# Patient Record
Sex: MALE | Race: WHITE | NOT HISPANIC OR LATINO | Employment: FULL TIME | URBAN - METROPOLITAN AREA
[De-identification: names, ages, dates, MRNs, and addresses within clinical notes are randomized per-mention and may not be internally consistent; named-entity substitution may affect disease eponyms.]

---

## 2017-01-12 ENCOUNTER — TRANSCRIBE ORDERS (OUTPATIENT)
Dept: ADMINISTRATIVE | Facility: HOSPITAL | Age: 42
End: 2017-01-12

## 2017-01-12 DIAGNOSIS — R05.9 COUGH: Primary | ICD-10-CM

## 2017-01-19 ENCOUNTER — HOSPITAL ENCOUNTER (OUTPATIENT)
Dept: RADIOLOGY | Facility: HOSPITAL | Age: 42
Discharge: HOME | End: 2017-01-19
Payer: COMMERCIAL

## 2017-01-19 DIAGNOSIS — R05.9 COUGH: ICD-10-CM

## 2017-01-19 PROCEDURE — 74240 X-RAY XM UPR GI TRC 1CNTRST: CPT

## 2018-11-20 ENCOUNTER — OFFICE VISIT (OUTPATIENT)
Dept: INTERNAL MEDICINE CLINIC | Facility: CLINIC | Age: 43
End: 2018-11-20
Payer: COMMERCIAL

## 2018-11-20 VITALS
RESPIRATION RATE: 18 BRPM | BODY MASS INDEX: 26.52 KG/M2 | HEIGHT: 72 IN | DIASTOLIC BLOOD PRESSURE: 80 MMHG | HEART RATE: 78 BPM | OXYGEN SATURATION: 97 % | WEIGHT: 195.8 LBS | TEMPERATURE: 97.6 F | SYSTOLIC BLOOD PRESSURE: 118 MMHG

## 2018-11-20 DIAGNOSIS — Z76.89 ENCOUNTER TO ESTABLISH CARE WITH NEW DOCTOR: Primary | ICD-10-CM

## 2018-11-20 DIAGNOSIS — G89.29 CHRONIC PAIN OF RIGHT KNEE: ICD-10-CM

## 2018-11-20 DIAGNOSIS — Z13.220 ENCOUNTER FOR LIPID SCREENING FOR CARDIOVASCULAR DISEASE: ICD-10-CM

## 2018-11-20 DIAGNOSIS — M25.561 CHRONIC PAIN OF RIGHT KNEE: ICD-10-CM

## 2018-11-20 DIAGNOSIS — Z13.6 ENCOUNTER FOR LIPID SCREENING FOR CARDIOVASCULAR DISEASE: ICD-10-CM

## 2018-11-20 DIAGNOSIS — K21.9 LARYNGOPHARYNGEAL REFLUX: ICD-10-CM

## 2018-11-20 PROCEDURE — 99204 OFFICE O/P NEW MOD 45 MIN: CPT | Performed by: INTERNAL MEDICINE

## 2018-11-20 PROCEDURE — 3008F BODY MASS INDEX DOCD: CPT | Performed by: INTERNAL MEDICINE

## 2018-11-20 PROCEDURE — 1036F TOBACCO NON-USER: CPT | Performed by: INTERNAL MEDICINE

## 2018-11-20 RX ORDER — OMEPRAZOLE 20 MG/1
CAPSULE, DELAYED RELEASE ORAL
COMMUNITY
Start: 2018-11-13 | End: 2019-09-18 | Stop reason: SDUPTHER

## 2018-11-20 NOTE — PATIENT INSTRUCTIONS
1  Knee x-ray  2  Physical therapy  3  Fasting blood work  4  Elastic knee brace  5  Low-impact exercises -> discuss with physical therapist  6  Return in 6 months or sooner if questions  7   Ranitidine 150mg twice a day for 2 days of week with 4 days of week omeprazole then increase ranitidine by 1 day per week over next 4 weeks

## 2018-11-20 NOTE — PROGRESS NOTES
Assessment/Plan:     Diagnoses and all orders for this visit:    Encounter to establish care with new doctor    Laryngopharyngeal reflux  Comments:  transition from PPI to H2B over 4-5 weeks, instructions provided  Orders:  -     Comprehensive metabolic panel; Future  -     CBC and differential    Encounter for lipid screening for cardiovascular disease  Comments:  check FLP, not done in sometime  Orders:  -     Lipid Panel with Direct LDL reflex; Future    Chronic pain of right knee  Comments:  suspect sprain, however if pain does not improve with PT(s/p knee x-ray), t/c MRI to r/o cartilage tear  Orders:  -     Ambulatory referral to Physical Therapy; Future  -     XR knee 3 vw right non injury; Future    Other orders  -     omeprazole (PriLOSEC) 20 mg delayed release capsule;           Subjective:      Patient ID: Eduardo Hoffman is a 38 y o  male  HPI    New to practice, here to establish care  Takes no medication on regular basis  Hx of LPR, s/p ENT Eval and prescribed PPI BID but tapering down dose over recent months and no return of symtpoms  Not exercising, works for [employer] in [place] on flu vaccine, c/o weeks of right medial knee pain  No swelling, previous knee problems or fall/injury prior to sx starting  Pain presents with use of knee  Would like to complete PT for knee pain  Denies any other complaints  History reviewed  No pertinent past medical history  Vitals:    11/20/18 0903   BP: 118/80   Pulse: 78   Resp: 18   Temp: 97 6 °F (36 4 °C)   TempSrc: Oral   SpO2: 97%   Weight: 88 8 kg (195 lb 12 8 oz)   Height: 5' 11 5" (1 816 m)     Body mass index is 26 93 kg/m²  Current Outpatient Prescriptions:     omeprazole (PriLOSEC) 20 mg delayed release capsule, , Disp: , Rfl:   No Known Allergies      Review of Systems   Constitutional: Negative for fever  HENT: Negative for congestion  Eyes: Negative for visual disturbance     Respiratory: Negative for shortness of breath  Cardiovascular: Negative for chest pain  Gastrointestinal: Negative for abdominal pain  Genitourinary: Negative for dysuria  Musculoskeletal: Positive for arthralgias  Negative for joint swelling  Skin: Negative for rash  Neurological: Negative for headaches  Psychiatric/Behavioral: Negative for dysphoric mood  Objective:      /80   Pulse 78   Temp 97 6 °F (36 4 °C) (Oral)   Resp 18   Ht 5' 11 5" (1 816 m)   Wt 88 8 kg (195 lb 12 8 oz)   SpO2 97%   BMI 26 93 kg/m²          Physical Exam   Constitutional: He appears well-developed and well-nourished  HENT:   Head: Normocephalic and atraumatic  Mouth/Throat: Oropharynx is clear and moist    Eyes: Pupils are equal, round, and reactive to light  Conjunctivae are normal    Cardiovascular: Normal rate, regular rhythm and normal heart sounds  No murmur heard  Pulmonary/Chest: Effort normal and breath sounds normal  He has no wheezes  He has no rales  Abdominal: Soft  Bowel sounds are normal  There is no tenderness  Musculoskeletal: He exhibits no edema  Right knee no edema, erythema or increased warmth  No crepitus or pain with passive ROM  lachman's test neg b/l   Neurological: He is alert  Psychiatric: He has a normal mood and affect  His behavior is normal    Vitals reviewed

## 2019-06-24 ENCOUNTER — APPOINTMENT (OUTPATIENT)
Dept: LAB | Facility: CLINIC | Age: 44
End: 2019-06-24
Payer: COMMERCIAL

## 2019-06-24 DIAGNOSIS — K21.9 LARYNGOPHARYNGEAL REFLUX: ICD-10-CM

## 2019-06-24 DIAGNOSIS — Z13.220 ENCOUNTER FOR LIPID SCREENING FOR CARDIOVASCULAR DISEASE: ICD-10-CM

## 2019-06-24 DIAGNOSIS — Z13.6 ENCOUNTER FOR LIPID SCREENING FOR CARDIOVASCULAR DISEASE: ICD-10-CM

## 2019-06-24 LAB
ALBUMIN SERPL BCP-MCNC: 4 G/DL (ref 3.5–5)
ALP SERPL-CCNC: 106 U/L (ref 46–116)
ALT SERPL W P-5'-P-CCNC: 43 U/L (ref 12–78)
ANION GAP SERPL CALCULATED.3IONS-SCNC: 5 MMOL/L (ref 4–13)
AST SERPL W P-5'-P-CCNC: 21 U/L (ref 5–45)
BASOPHILS # BLD AUTO: 0.02 THOUSANDS/ΜL (ref 0–0.1)
BASOPHILS NFR BLD AUTO: 0 % (ref 0–1)
BILIRUB SERPL-MCNC: 0.58 MG/DL (ref 0.2–1)
BUN SERPL-MCNC: 16 MG/DL (ref 5–25)
CALCIUM SERPL-MCNC: 9.3 MG/DL (ref 8.3–10.1)
CHLORIDE SERPL-SCNC: 103 MMOL/L (ref 100–108)
CHOLEST SERPL-MCNC: 225 MG/DL (ref 50–200)
CO2 SERPL-SCNC: 28 MMOL/L (ref 21–32)
CREAT SERPL-MCNC: 0.94 MG/DL (ref 0.6–1.3)
EOSINOPHIL # BLD AUTO: 0.16 THOUSAND/ΜL (ref 0–0.61)
EOSINOPHIL NFR BLD AUTO: 3 % (ref 0–6)
ERYTHROCYTE [DISTWIDTH] IN BLOOD BY AUTOMATED COUNT: 11.9 % (ref 11.6–15.1)
GFR SERPL CREATININE-BSD FRML MDRD: 98 ML/MIN/1.73SQ M
GLUCOSE P FAST SERPL-MCNC: 89 MG/DL (ref 65–99)
HCT VFR BLD AUTO: 47.8 % (ref 36.5–49.3)
HDLC SERPL-MCNC: 55 MG/DL (ref 40–60)
HGB BLD-MCNC: 16 G/DL (ref 12–17)
IMM GRANULOCYTES # BLD AUTO: 0.02 THOUSAND/UL (ref 0–0.2)
IMM GRANULOCYTES NFR BLD AUTO: 0 % (ref 0–2)
LDLC SERPL CALC-MCNC: 151 MG/DL (ref 0–100)
LYMPHOCYTES # BLD AUTO: 1.92 THOUSANDS/ΜL (ref 0.6–4.47)
LYMPHOCYTES NFR BLD AUTO: 32 % (ref 14–44)
MCH RBC QN AUTO: 29.7 PG (ref 26.8–34.3)
MCHC RBC AUTO-ENTMCNC: 33.5 G/DL (ref 31.4–37.4)
MCV RBC AUTO: 89 FL (ref 82–98)
MONOCYTES # BLD AUTO: 0.56 THOUSAND/ΜL (ref 0.17–1.22)
MONOCYTES NFR BLD AUTO: 9 % (ref 4–12)
NEUTROPHILS # BLD AUTO: 3.4 THOUSANDS/ΜL (ref 1.85–7.62)
NEUTS SEG NFR BLD AUTO: 56 % (ref 43–75)
NRBC BLD AUTO-RTO: 0 /100 WBCS
PLATELET # BLD AUTO: 188 THOUSANDS/UL (ref 149–390)
PMV BLD AUTO: 10.9 FL (ref 8.9–12.7)
POTASSIUM SERPL-SCNC: 4.8 MMOL/L (ref 3.5–5.3)
PROT SERPL-MCNC: 7.9 G/DL (ref 6.4–8.2)
RBC # BLD AUTO: 5.38 MILLION/UL (ref 3.88–5.62)
SODIUM SERPL-SCNC: 136 MMOL/L (ref 136–145)
TRIGL SERPL-MCNC: 93 MG/DL
WBC # BLD AUTO: 6.08 THOUSAND/UL (ref 4.31–10.16)

## 2019-06-24 PROCEDURE — 80061 LIPID PANEL: CPT

## 2019-06-24 PROCEDURE — 36415 COLL VENOUS BLD VENIPUNCTURE: CPT | Performed by: INTERNAL MEDICINE

## 2019-06-24 PROCEDURE — 80053 COMPREHEN METABOLIC PANEL: CPT

## 2019-06-24 PROCEDURE — 85025 COMPLETE CBC W/AUTO DIFF WBC: CPT | Performed by: INTERNAL MEDICINE

## 2019-06-25 ENCOUNTER — OFFICE VISIT (OUTPATIENT)
Dept: INTERNAL MEDICINE CLINIC | Facility: CLINIC | Age: 44
End: 2019-06-25
Payer: COMMERCIAL

## 2019-06-25 VITALS
HEART RATE: 84 BPM | SYSTOLIC BLOOD PRESSURE: 124 MMHG | WEIGHT: 202 LBS | HEIGHT: 72 IN | BODY MASS INDEX: 27.36 KG/M2 | DIASTOLIC BLOOD PRESSURE: 84 MMHG | TEMPERATURE: 98.1 F | OXYGEN SATURATION: 98 % | RESPIRATION RATE: 16 BRPM

## 2019-06-25 DIAGNOSIS — K21.9 LARYNGOPHARYNGEAL REFLUX: ICD-10-CM

## 2019-06-25 DIAGNOSIS — L81.9 CHANGE IN PIGMENTED SKIN LESION: Primary | ICD-10-CM

## 2019-06-25 DIAGNOSIS — E78.5 DYSLIPIDEMIA: ICD-10-CM

## 2019-06-25 PROCEDURE — 1036F TOBACCO NON-USER: CPT | Performed by: INTERNAL MEDICINE

## 2019-06-25 PROCEDURE — 3008F BODY MASS INDEX DOCD: CPT | Performed by: INTERNAL MEDICINE

## 2019-06-25 PROCEDURE — 99214 OFFICE O/P EST MOD 30 MIN: CPT | Performed by: INTERNAL MEDICINE

## 2019-11-20 ENCOUNTER — OFFICE VISIT (OUTPATIENT)
Dept: INTERNAL MEDICINE CLINIC | Facility: CLINIC | Age: 44
End: 2019-11-20
Payer: COMMERCIAL

## 2019-11-20 VITALS
SYSTOLIC BLOOD PRESSURE: 128 MMHG | HEART RATE: 105 BPM | BODY MASS INDEX: 26.68 KG/M2 | OXYGEN SATURATION: 97 % | HEIGHT: 72 IN | DIASTOLIC BLOOD PRESSURE: 80 MMHG | WEIGHT: 197 LBS

## 2019-11-20 DIAGNOSIS — Z84.81 FHX: BRCA2 GENE POSITIVE: ICD-10-CM

## 2019-11-20 DIAGNOSIS — G47.9 TROUBLE IN SLEEPING: ICD-10-CM

## 2019-11-20 DIAGNOSIS — F43.0 ANXIETY AS ACUTE REACTION TO GROSS STRESS: Primary | ICD-10-CM

## 2019-11-20 DIAGNOSIS — F41.1 ANXIETY AS ACUTE REACTION TO GROSS STRESS: Primary | ICD-10-CM

## 2019-11-20 PROCEDURE — 1036F TOBACCO NON-USER: CPT | Performed by: INTERNAL MEDICINE

## 2019-11-20 PROCEDURE — 99214 OFFICE O/P EST MOD 30 MIN: CPT | Performed by: INTERNAL MEDICINE

## 2019-11-20 RX ORDER — HYDROXYZINE PAMOATE 25 MG/1
25 CAPSULE ORAL 2 TIMES DAILY PRN
Qty: 30 CAPSULE | Refills: 0 | Status: SHIPPED | OUTPATIENT
Start: 2019-11-20 | End: 2020-02-18 | Stop reason: SDUPTHER

## 2019-11-20 RX ORDER — TRAZODONE HYDROCHLORIDE 50 MG/1
25 TABLET ORAL
Qty: 15 TABLET | Refills: 0 | Status: SHIPPED | OUTPATIENT
Start: 2019-11-20 | End: 2019-12-12 | Stop reason: SDUPTHER

## 2019-11-20 NOTE — PATIENT INSTRUCTIONS
1  Vistaril as needed, may cause drowsiness  2  Appointment with therapist  3  Return in 1 month    Mastery of Your Anxiety and Worry by Aramis Ibrahim  The relaxation and stress reduction workbook by Dakota Cortez  "E-couch" an online free therapy program    Anxiety   AMBULATORY CARE:   Anxiety  is a condition that causes you to feel extremely worried or nervous  The feelings are so strong that they can cause problems with your daily activities or sleep  Anxiety may be triggered by something you fear, or it may happen without a cause  Family or work stress, smoking, caffeine, and alcohol can increase your risk for anxiety  Certain medicines or health conditions can also increase your risk  Anxiety can become a long-term condition if it is not managed or treated  Common signs and symptoms that may occur with anxiety:   · Fatigue or muscle tightness     · Shaking, restlessness, or irritability     · Problems focusing     · Trouble sleeping     · Feeling jumpy, easily startled, or dizzy     · Rapid heartbeat or shortness of breath  Call 911 if:   · You have chest pain, tightness, or heaviness that may spread to your shoulders, arms, jaw, neck, or back  · You feel like hurting yourself or someone else  Contact your healthcare provider if:   · Your symptoms get worse or do not get better with treatment  · You think your medicine may be causing side effects  · Your anxiety keeps you from doing your regular daily activities  · You have new symptoms since your last visit  · You have questions or concerns about your condition or care  Treatment for anxiety  may include medicines to help you feel calm and relaxed, and decrease your symptoms  Medicines are usually given together with therapy or other treatments  Manage anxiety:   · Talk to someone about your anxiety  Your healthcare provider may suggest counseling   Cognitive behavioral therapy can help you understand and change how you react to events that trigger your symptoms  You might feel more comfortable talking with a friend or family member about your anxiety  Choose someone you know will be supportive and encouraging  · Find ways to relax  Activities such as exercise, meditation, or listening to music can help you relax  Spend time with friends, or do things you enjoy  · Practice deep breathing  Deep breathing can help you relax when you feel anxious  Focus on taking slow, deep breaths several times a day, or during an anxiety attack  Breathe in through your nose and out through your mouth  · Create a regular sleep routine  Regular sleep can help you feel calmer during the day  Go to sleep and wake up at the same times every day  Do not watch television or use the computer right before bed  Your room should be comfortable, dark, and quiet  · Eat a variety of healthy foods  Healthy foods include fruits, vegetables, low-fat dairy products, lean meats, fish, whole-grain breads, and cooked beans  Healthy foods can help you feel less anxious and have more energy  · Exercise regularly  Exercise can increase your energy level  Exercise may also lift your mood and help you sleep better  Your healthcare provider can help you create an exercise plan  · Do not smoke  Nicotine and other chemicals in cigarettes and cigars can increase anxiety  Ask your healthcare provider for information if you currently smoke and need help to quit  E-cigarettes or smokeless tobacco still contain nicotine  Talk to your healthcare provider before you use these products  · Do not have caffeine  Caffeine can make your symptoms worse  Do not have foods or drinks that are meant to increase your energy level  · Limit or do not drink alcohol  Ask your healthcare provider if alcohol is safe for you  You may not be able to drink alcohol if you take certain anxiety or depression medicines  Limit alcohol to 1 drink per day if you are a woman   Limit alcohol to 2 drinks per day if you are a man  A drink of alcohol is 12 ounces of beer, 5 ounces of wine, or 1½ ounces of liquor  · Do not use drugs  Drugs can make your anxiety worse  It can also make anxiety hard to manage  Talk to your healthcare provider if you use drugs and want help to quit  Follow up with your healthcare provider as directed:  Write down your questions so you remember to ask them during your visits  © 2017 [employer] [employer] Information is for End User's use only and may not be sold, redistributed or otherwise used for commercial purposes  All illustrations and images included in CareNotes® are the copyrighted property of A D A M , Inc  or Hardy Ahuja  The above information is an  only  It is not intended as medical advice for individual conditions or treatments  Talk to your doctor, nurse or pharmacist before following any medical regimen to see if it is safe and effective for you

## 2019-11-20 NOTE — PROGRESS NOTES
Assessment/Plan:     Diagnoses and all orders for this visit:    Anxiety as acute reaction to gross stress  Comments:  counseling provided including exercises, self-help books and advised to go back and see therapist   trazodone and vistaril ordered per pt request, f/u in 1 mo  Orders:  -     hydrOXYzine pamoate (VISTARIL) 25 mg capsule; Take 1 capsule (25 mg total) by mouth 2 (two) times a day as needed for anxiety (may cause drowsiness)    Trouble in sleeping  Comments:  trial of trazodone, d/w pt side effects of meds prescribed today  Orders:  -     traZODone (DESYREL) 50 mg tablet; Take 0 5 tablets (25 mg total) by mouth daily at bedtime as needed for sleep    FHx: BRCA2 gene positive  Comments:  recommend genetic counseling, pt prefers to put this off for now    Other orders  -     Cancel: influenza vaccine, [id number], quadrivalent, 0 5 mL, preservative-free, for adult and pediatric patients 6 mos+ (AFLURIA, FLUARIX, [place] 9101, FLUZONE)      patient prefers to put off discussing genetic counseling at this time, will discuss referring him again at his next appointment  Spent >25 mins with patient including >50% of time counseling/coordination of care      Subjective:      Patient ID: Terrell Tucker is a 37 y o  male  HPI    Here with c/o acute stress/anxiety  Here with friend from work (Dayana) who pt would like present during appt to discuss his medical care  Has some mild anxiety symptoms in past but never treated and denies depression  is a single father, raising a teenage daughter  work is stressful in IT at [employer]  mother recently dx'd with breast cancer and has BRCA2 gene as well  MARCELLE-7 score of 5-6  Had a panic attack few years ago after watching a [employer] game in [place], went to his parents and felt better  Having some trouble sleeping recently as well  Was seeing a counselor few years ago when went thru divorce and is planning on calling him today to make an appt        Has had occ head shaking when he becomes stressed & has done this since childhood  no one has noticed this except his parents  also c/o teeth chattering also when he feels stressed  Worried this could be symptoms of a neurologic disorder  No other complaints  History reviewed  No pertinent past medical history  Vitals:    11/20/19 0936 11/20/19 1014 11/20/19 1020   BP: 142/94 138/90 128/80   BP Location: Right arm  Left arm   Patient Position: Sitting Sitting Sitting   Cuff Size: Standard  Standard   Pulse: 105     SpO2: 97%     Weight: 89 4 kg (197 lb)     Height: 5' 11 5" (1 816 m)       Body mass index is 27 09 kg/m²  Current Outpatient Medications:     hydrOXYzine pamoate (VISTARIL) 25 mg capsule, Take 1 capsule (25 mg total) by mouth 2 (two) times a day as needed for anxiety (may cause drowsiness), Disp: 30 capsule, Rfl: 0    omeprazole (PriLOSEC) 20 mg delayed release capsule, TAKE 1 (ONE) CAPSULE DR DAILY, Disp: 90 capsule, Rfl: 4    traZODone (DESYREL) 50 mg tablet, Take 0 5 tablets (25 mg total) by mouth daily at bedtime as needed for sleep, Disp: 15 tablet, Rfl: 0  No Known Allergies      Review of Systems   Constitutional: Negative for fever  HENT: Negative for congestion  Eyes: Negative for visual disturbance  Respiratory: Negative for shortness of breath  Cardiovascular: Negative for chest pain  Gastrointestinal: Negative for abdominal pain  Endocrine: Negative for polyuria  Genitourinary: Negative for dysuria  Musculoskeletal: Negative for arthralgias  Skin: Negative for rash  Allergic/Immunologic: Negative for immunocompromised state  Neurological: Negative for headaches  Psychiatric/Behavioral: Positive for sleep disturbance  Negative for dysphoric mood  The patient is nervous/anxious            Objective:      /80 (BP Location: Left arm, Patient Position: Sitting, Cuff Size: Standard)   Pulse 105   Ht 5' 11 5" (1 816 m)   Wt 89 4 kg (197 lb)   SpO2 97%   BMI 27 09 kg/m²          Physical Exam   Constitutional: He is oriented to person, place, and time  He appears well-developed and well-nourished  HENT:   Head: Normocephalic and atraumatic  Eyes: Pupils are equal, round, and reactive to light  Cardiovascular: Normal rate and regular rhythm  Pulmonary/Chest: Effort normal and breath sounds normal  He has no wheezes  He has no rales  Abdominal: Soft  Bowel sounds are normal  There is no tenderness  Musculoskeletal: He exhibits no edema  Neurological: He is alert and oriented to person, place, and time  He has normal strength and normal reflexes  He displays no tremor (no head or hand/arm tremor b/l)  He displays a negative Romberg sign  Coordination and gait normal    Psychiatric: His behavior is normal  His mood appears anxious  He does not exhibit a depressed mood  Vitals reviewed  MARCELLE-7 Flowsheet Screening      Most Recent Value   Over the last two weeks, how often have you been bothered by the following problems?     Feeling nervous, anxious, or on edge  1   Not being able to stop or control worrying  2   Worrying too much about different things  1   Trouble relaxing   1   Being so restless that it's hard to sit still  0   Becoming easily annoyed or irritable   0   Feeling afraid as if something awful might happen  1   MARCELLE Score   6        Results for orders placed or performed in visit on 06/24/19   Lipid Panel with Direct LDL reflex   Result Value Ref Range    Cholesterol 225 (H) 50 - 200 mg/dL    Triglycerides 93 <=150 mg/dL    HDL, Direct 55 40 - 60 mg/dL    LDL Calculated 151 (H) 0 - 100 mg/dL   Comprehensive metabolic panel   Result Value Ref Range    Sodium 136 136 - 145 mmol/L    Potassium 4 8 3 5 - 5 3 mmol/L    Chloride 103 100 - 108 mmol/L    CO2 28 21 - 32 mmol/L    ANION GAP 5 4 - 13 mmol/L    BUN 16 5 - 25 mg/dL    Creatinine 0 94 0 60 - 1 30 mg/dL    Glucose, Fasting 89 65 - 99 mg/dL    Calcium 9 3 8 3 - 10 1 mg/dL    AST 21 5 - 45 U/L    ALT 43 12 - 78 U/L    Alkaline Phosphatase 106 46 - 116 U/L    Total Protein 7 9 6 4 - 8 2 g/dL    Albumin 4 0 3 5 - 5 0 g/dL    Total Bilirubin 0 58 0 20 - 1 00 mg/dL    eGFR 98 ml/min/1 73sq m

## 2019-12-12 DIAGNOSIS — G47.9 TROUBLE IN SLEEPING: ICD-10-CM

## 2019-12-12 RX ORDER — TRAZODONE HYDROCHLORIDE 50 MG/1
TABLET ORAL
Qty: 15 TABLET | Refills: 0 | Status: SHIPPED | OUTPATIENT
Start: 2019-12-12 | End: 2020-01-03 | Stop reason: SDUPTHER

## 2019-12-19 ENCOUNTER — OFFICE VISIT (OUTPATIENT)
Dept: INTERNAL MEDICINE CLINIC | Facility: CLINIC | Age: 44
End: 2019-12-19
Payer: COMMERCIAL

## 2019-12-19 VITALS
DIASTOLIC BLOOD PRESSURE: 88 MMHG | OXYGEN SATURATION: 99 % | SYSTOLIC BLOOD PRESSURE: 126 MMHG | HEART RATE: 74 BPM | BODY MASS INDEX: 27.22 KG/M2 | TEMPERATURE: 97.5 F | WEIGHT: 201 LBS | HEIGHT: 72 IN

## 2019-12-19 DIAGNOSIS — K21.9 LARYNGOPHARYNGEAL REFLUX: ICD-10-CM

## 2019-12-19 DIAGNOSIS — F43.0 ACUTE REACTION TO STRESS: ICD-10-CM

## 2019-12-19 DIAGNOSIS — E78.5 DYSLIPIDEMIA: Primary | ICD-10-CM

## 2019-12-19 DIAGNOSIS — R03.0 ELEVATED BLOOD PRESSURE READING IN OFFICE WITH WHITE COAT SYNDROME, WITHOUT DIAGNOSIS OF HYPERTENSION: ICD-10-CM

## 2019-12-19 PROCEDURE — 1036F TOBACCO NON-USER: CPT | Performed by: INTERNAL MEDICINE

## 2019-12-19 PROCEDURE — 99213 OFFICE O/P EST LOW 20 MIN: CPT | Performed by: INTERNAL MEDICINE

## 2019-12-19 PROCEDURE — 3008F BODY MASS INDEX DOCD: CPT | Performed by: INTERNAL MEDICINE

## 2019-12-19 NOTE — PROGRESS NOTES
Assessment/Plan:     Diagnoses and all orders for this visit:    Dyslipidemia  Comments:  low chol diet discussed, re-check FLP in summer  Orders:  -     Lipid Panel with Direct LDL reflex; Future  -     Basic metabolic panel; Future    Laryngopharyngeal reflux  Comments:  improved with PPI, c/w rx  Orders:  -     Basic metabolic panel; Future    Acute reaction to stress  Comments:  nearly resolved, c/w vistaril prn and seeing therapist    Elevated blood pressure reading in office with white coat syndrome, without diagnosis of hypertension  Comments: Bp elevated in office, likely component of white coat syndrome  re-check at RN visit in 2 mos  reviewed benefits of DASH diet & exercise          Subjective:      Patient ID: Kiko Medina is a 53 y o  male  HPI    Here for follow up, feeling better since seeing me last month  He saw his therapist and has been coping with stressors  He took vistaril one time when he felt stressed while in [place] and was able to calm down & sleep  Not exercising & Hx of hyperlipidemia, we discussed low chol diet today  Overall feels better, defers on seeing genetic counselor at this time  Has less ROM when turning head to left vs right, has had this since adolescent years and no associated pain & would like to discuss this further at his upcoming appt in summer 2020  No other complaints  History reviewed  No pertinent past medical history  Vitals:    12/19/19 1101 12/19/19 1126   BP: 128/94 126/88   BP Location: Right arm Right arm   Patient Position: Sitting Sitting   Cuff Size: Standard Standard   Pulse: 74    Temp: 97 5 °F (36 4 °C)    SpO2: 99%    Weight: 91 2 kg (201 lb)    Height: 5' 11 5" (1 816 m)      Body mass index is 27 64 kg/m²      Current Outpatient Medications:     hydrOXYzine pamoate (VISTARIL) 25 mg capsule, Take 1 capsule (25 mg total) by mouth 2 (two) times a day as needed for anxiety (may cause drowsiness), Disp: 30 capsule, Rfl: 0    omeprazole (PriLOSEC) 20 mg delayed release capsule, TAKE 1 (ONE) CAPSULE DR DAILY, Disp: 90 capsule, Rfl: 4    traZODone (DESYREL) 50 mg tablet, TAKE A HALF TABLET (25 MGS) BY MOUTH DAILY AT BEDTIME AS NEEDED FOR SLEEP, Disp: 15 tablet, Rfl: 0  No Known Allergies      Review of Systems   Constitutional: Negative for fever  HENT: Negative for congestion  Eyes: Negative for visual disturbance  Respiratory: Negative for shortness of breath  Cardiovascular: Negative for chest pain  Gastrointestinal: Negative for abdominal pain  Endocrine: Negative for polyuria  Genitourinary: Negative for dysuria  Musculoskeletal: Negative for neck pain  Skin: Negative for rash  Allergic/Immunologic: Negative for immunocompromised state  Neurological: Negative for headaches  Psychiatric/Behavioral: Negative for dysphoric mood (doing better)  Objective:      /88 (BP Location: Right arm, Patient Position: Sitting, Cuff Size: Standard)   Pulse 74   Temp 97 5 °F (36 4 °C)   Ht 5' 11 5" (1 816 m)   Wt 91 2 kg (201 lb)   SpO2 99%   BMI 27 64 kg/m²          Physical Exam   Constitutional: He appears well-developed and well-nourished  HENT:   Head: Normocephalic and atraumatic  Eyes: Pupils are equal, round, and reactive to light  Cardiovascular: Normal rate and regular rhythm  No murmur heard  Pulmonary/Chest: Effort normal and breath sounds normal  He has no wheezes  He has no rales  Abdominal: Soft  Bowel sounds are normal  There is no tenderness  Musculoskeletal: He exhibits no edema  Neurological: He is alert  Psychiatric: He has a normal mood and affect  His behavior is normal    Vitals reviewed

## 2020-01-03 DIAGNOSIS — G47.9 TROUBLE IN SLEEPING: ICD-10-CM

## 2020-01-03 RX ORDER — TRAZODONE HYDROCHLORIDE 50 MG/1
25 TABLET ORAL
Qty: 45 TABLET | Refills: 0 | Status: SHIPPED | OUTPATIENT
Start: 2020-01-03 | End: 2020-04-02

## 2020-02-13 ENCOUNTER — HOSPITAL ENCOUNTER (EMERGENCY)
Facility: HOSPITAL | Age: 45
Discharge: HOME | End: 2020-02-13
Attending: EMERGENCY MEDICINE | Admitting: EMERGENCY MEDICINE
Payer: COMMERCIAL

## 2020-02-13 VITALS
RESPIRATION RATE: 18 BRPM | SYSTOLIC BLOOD PRESSURE: 127 MMHG | DIASTOLIC BLOOD PRESSURE: 92 MMHG | TEMPERATURE: 97.5 F | BODY MASS INDEX: 27.04 KG/M2 | OXYGEN SATURATION: 96 % | HEART RATE: 106 BPM | WEIGHT: 196.65 LBS

## 2020-02-13 DIAGNOSIS — R55 NEAR SYNCOPE: Primary | ICD-10-CM

## 2020-02-13 LAB
ANION GAP SERPL CALCULATED.3IONS-SCNC: 11 MMOL/L (ref 4–13)
ATRIAL RATE: 111 BPM
BUN SERPL-MCNC: 15 MG/DL (ref 5–25)
CALCIUM SERPL-MCNC: 9.7 MG/DL (ref 8.3–10.1)
CHLORIDE SERPL-SCNC: 103 MMOL/L (ref 100–108)
CO2 SERPL-SCNC: 24 MMOL/L (ref 21–32)
CREAT SERPL-MCNC: 0.91 MG/DL (ref 0.6–1.3)
GFR SERPL CREATININE-BSD FRML MDRD: 101 ML/MIN/1.73SQ M
GLUCOSE SERPL-MCNC: 91 MG/DL (ref 65–140)
P AXIS: 60 DEGREES
POTASSIUM SERPL-SCNC: 4.1 MMOL/L (ref 3.5–5.3)
PR INTERVAL: 148 MS
QRS AXIS: 4 DEGREES
QRSD INTERVAL: 78 MS
QT INTERVAL: 306 MS
QTC INTERVAL: 409 MS
SODIUM SERPL-SCNC: 138 MMOL/L (ref 136–145)
T WAVE AXIS: 54 DEGREES
TSH SERPL DL<=0.05 MIU/L-ACNC: 1.16 UIU/ML (ref 0.36–3.74)
VENTRICULAR RATE: 107 BPM

## 2020-02-13 PROCEDURE — 36415 COLL VENOUS BLD VENIPUNCTURE: CPT | Performed by: EMERGENCY MEDICINE

## 2020-02-13 PROCEDURE — 80048 BASIC METABOLIC PNL TOTAL CA: CPT | Performed by: EMERGENCY MEDICINE

## 2020-02-13 PROCEDURE — 99283 EMERGENCY DEPT VISIT LOW MDM: CPT | Performed by: EMERGENCY MEDICINE

## 2020-02-13 PROCEDURE — 99285 EMERGENCY DEPT VISIT HI MDM: CPT

## 2020-02-13 PROCEDURE — 93005 ELECTROCARDIOGRAM TRACING: CPT

## 2020-02-13 PROCEDURE — 93010 ELECTROCARDIOGRAM REPORT: CPT | Performed by: INTERNAL MEDICINE

## 2020-02-13 PROCEDURE — 84443 ASSAY THYROID STIM HORMONE: CPT | Performed by: EMERGENCY MEDICINE

## 2020-02-13 NOTE — ED PROVIDER NOTES
History  No chief complaint on file  Sienna Cason is a 49-year-old male, presenting to [facility] ED, possessing pertinent history of anxiety and insomnia, endorsing worsening anxiety/irritability and palpitations; never evaluated by psychiatry  He states that earlier this a m , he was evaluated and subsequently referred for ED evaluation for abnormal EKG  Patient denies changes in vision, chest pain, shortness of breath and syncopal episode, although he states in the shower this a m  he became lightheaded that prompted [facility] evaluation  He endorses significant stressors, though he is unaware of any particular trigger  He is prescribed Vistaril p r n  and states this slightly alleviates the anxiety  Prior to Admission Medications   Prescriptions Last Dose Informant Patient Reported? Taking?   hydrOXYzine pamoate (VISTARIL) 25 mg capsule   No No   Sig: Take 1 capsule (25 mg total) by mouth 2 (two) times a day as needed for anxiety (may cause drowsiness)   omeprazole (PriLOSEC) 20 mg delayed release capsule   No No   Sig: TAKE 1 (ONE) CAPSULE DR DAILY   traZODone (DESYREL) 50 mg tablet   No No   Sig: Take 0 5 tablets (25 mg total) by mouth daily at bedtime as needed for sleep      Facility-Administered Medications: None       No past medical history on file      Past Surgical History:   Procedure Laterality Date    TONSILLECTOMY         Family History   Problem Relation Age of Onset    Depression Mother     Anxiety disorder Mother     Hyperparathyroidism Mother         parathryoidectomy    Breast cancer Mother 77        BRCA2 positive    Asthma Father     Lymphoma Father 79        NHL    Depression Sister     Anxiety disorder Sister     Pancreatic cancer Maternal Grandmother     Emphysema Paternal Grandfather         smoker    Alcohol abuse Neg Hx     Substance Abuse Neg Hx     Mental illness Neg Hx      I have reviewed and agree with the history as documented  Social History     Tobacco Use    Smoking status: Never Smoker    Smokeless tobacco: Never Used   Substance Use Topics    Alcohol use: Yes     Alcohol/week: 5 0 standard drinks     Types: 5 Standard drinks or equivalent per week    Drug use: No        Review of Systems   Constitutional: Negative for activity change, appetite change, diaphoresis and fatigue  HENT: Negative for hearing loss and tinnitus  Eyes: Negative for photophobia and visual disturbance  Respiratory: Negative for cough, chest tightness and shortness of breath  Cardiovascular: Positive for palpitations  Negative for chest pain  Gastrointestinal: Positive for diarrhea  Negative for abdominal distention, abdominal pain, nausea and vomiting  Genitourinary: Negative for dysuria, frequency, hematuria and urgency  Musculoskeletal: Negative for arthralgias and myalgias  Skin: Negative for color change  Neurological: Positive for dizziness and light-headedness  Negative for syncope, weakness, numbness and headaches  Psychiatric/Behavioral: Negative for behavioral problems and confusion  The patient is nervous/anxious  Physical Exam  ED Triage Vitals   Temp Pulse Resp BP SpO2   -- -- -- -- --      Temp src Heart Rate Source Patient Position - Orthostatic VS BP Location FiO2 (%)   -- -- -- -- --      Pain Score       --             Orthostatic Vital Signs  There were no vitals filed for this visit  Physical Exam   Constitutional: He is oriented to person, place, and time  He appears well-developed and well-nourished  No distress  HENT:   Head: Normocephalic and atraumatic  Eyes: Pupils are equal, round, and reactive to light  EOM are normal    Neck: Normal range of motion  Neck supple  Cardiovascular: Regular rhythm, normal heart sounds, intact distal pulses and normal pulses  Tachycardia present  Exam reveals no friction rub  No murmur heard    Pulmonary/Chest: Effort normal and breath sounds normal  No respiratory distress  He has no decreased breath sounds  He exhibits no tenderness  Musculoskeletal: Normal range of motion  Neurological: He is alert and oriented to person, place, and time  Skin: Skin is warm and dry  Capillary refill takes less than 2 seconds  He is not diaphoretic  Psychiatric: His speech is normal and behavior is normal  Thought content normal  His mood appears anxious  Cognition and memory are normal    Vitals reviewed  ED Medications  Medications - No data to display    Diagnostic Studies  Results Reviewed     None                 No orders to display         Procedures  Procedures      ED Course                               MDM      Disposition  Final diagnoses:   None     ED Disposition     None      Follow-up Information    None         Patient's Medications   Discharge Prescriptions    No medications on file     No discharge procedures on file  ED Provider  Attending physically available and evaluated Nohemy Garzon I managed the patient along with the ED Attending      Electronically Signed by         Vernelle Rima Holter, DO  02/13/20 3726

## 2020-02-18 ENCOUNTER — OFFICE VISIT (OUTPATIENT)
Dept: INTERNAL MEDICINE CLINIC | Facility: CLINIC | Age: 45
End: 2020-02-18
Payer: COMMERCIAL

## 2020-02-18 VITALS
WEIGHT: 198.4 LBS | OXYGEN SATURATION: 95 % | HEART RATE: 100 BPM | DIASTOLIC BLOOD PRESSURE: 72 MMHG | HEIGHT: 72 IN | BODY MASS INDEX: 26.87 KG/M2 | SYSTOLIC BLOOD PRESSURE: 132 MMHG

## 2020-02-18 DIAGNOSIS — F43.0 ANXIETY AS ACUTE REACTION TO GROSS STRESS: Primary | ICD-10-CM

## 2020-02-18 DIAGNOSIS — F41.1 ANXIETY AS ACUTE REACTION TO GROSS STRESS: Primary | ICD-10-CM

## 2020-02-18 DIAGNOSIS — R55 NEAR SYNCOPE: ICD-10-CM

## 2020-02-18 PROCEDURE — 1036F TOBACCO NON-USER: CPT | Performed by: INTERNAL MEDICINE

## 2020-02-18 PROCEDURE — 3008F BODY MASS INDEX DOCD: CPT | Performed by: INTERNAL MEDICINE

## 2020-02-18 PROCEDURE — 99213 OFFICE O/P EST LOW 20 MIN: CPT | Performed by: INTERNAL MEDICINE

## 2020-02-18 RX ORDER — HYDROXYZINE PAMOATE 25 MG/1
25 CAPSULE ORAL 2 TIMES DAILY PRN
Qty: 30 CAPSULE | Refills: 0 | Status: SHIPPED | OUTPATIENT
Start: 2020-02-18 | End: 2020-04-02 | Stop reason: SDUPTHER

## 2020-02-18 NOTE — PROGRESS NOTES
Assessment/Plan:     Diagnoses and all orders for this visit:    Anxiety as acute reaction to gross stress  Comments:  recently worse, c/w vistaril prn and therapy  Silvano Thayer would like to take some time off from work which is [employer] note provided), f/u 6 wks  Orders:  -     hydrOXYzine pamoate (VISTARIL) 25 mg capsule; Take 1 capsule (25 mg total) by mouth 2 (two) times a day as needed for anxiety (may cause drowsiness)    Near syncope  Comments:  s/p ER work up   holter monitor ordered and advised to avoid caffeine and add'l treatment for anxiety as above  Orders:  -     Holter monitor - 48 hour; Future          Subjective:      Patient ID: Anderson Corrales is a 53 y o  male  HPI    Here for follow up, here with friend during today's appt  Had more stress over last few weeks with daughter going thru some testing and staying up at night to help with daughter's extracurricular programs  Silvano Thayer drink a lot of coffee last week(tuesday) and did not sleep well  The following night, he took vistaril and was able to calm and sleep  When he woke up on Thursday last week, he felt dizzy and near syncopal in shower but did not fall or pass out  [facility] and had work up including EKG and BW  He was advised to consider holter monitor as an outpatient  He had no palpitations or nausea at time of near syncope  No hx of heart disease  Silvano Thayer has cut out drinking coffee lately and is still seeing his therapist   He has been feeling more stressed, as above, and anxious but the vistaril is effective in calming him  He has not taken trazodone yet  ROS otherwise negative, no other complaints  History reviewed  No pertinent past medical history    Vitals:    02/18/20 1237   BP: 132/72   BP Location: Left arm   Patient Position: Sitting   Cuff Size: Standard   Pulse: 100   SpO2: 95%   Weight: 90 kg (198 lb 6 4 oz)   Height: 5' 11 5" (1 816 m)     Body mass index is 27 29 kg/m²  Current Outpatient Medications:     hydrOXYzine pamoate (VISTARIL) 25 mg capsule, Take 1 capsule (25 mg total) by mouth 2 (two) times a day as needed for anxiety (may cause drowsiness), Disp: 30 capsule, Rfl: 0    omeprazole (PriLOSEC) 20 mg delayed release capsule, TAKE 1 (ONE) CAPSULE DR DAILY, Disp: 90 capsule, Rfl: 4    traZODone (DESYREL) 50 mg tablet, Take 0 5 tablets (25 mg total) by mouth daily at bedtime as needed for sleep (Patient not taking: Reported on 2/13/2020), Disp: 45 tablet, Rfl: 0  No Known Allergies      Review of Systems   Constitutional: Negative for fever  HENT: Negative for congestion  Eyes: Negative for visual disturbance  Respiratory: Negative for shortness of breath  Cardiovascular: Negative for chest pain  Gastrointestinal: Negative for abdominal pain  Endocrine: Negative for polyuria  Genitourinary: Negative for dysuria  Musculoskeletal: Negative for arthralgias  Skin: Negative for rash  Allergic/Immunologic: Negative for immunocompromised state  Neurological: Positive for dizziness (but resolved since episode last week)  Psychiatric/Behavioral: Positive for sleep disturbance  Negative for dysphoric mood  The patient is nervous/anxious  Objective:      /72 (BP Location: Left arm, Patient Position: Sitting, Cuff Size: Standard)   Pulse 100   Ht 5' 11 5" (1 816 m)   Wt 90 kg (198 lb 6 4 oz)   SpO2 95%   BMI 27 29 kg/m²          Physical Exam   Constitutional: He appears well-developed and well-nourished  HENT:   Head: Normocephalic and atraumatic  Eyes: Pupils are equal, round, and reactive to light  Cardiovascular: Normal rate and regular rhythm  No murmur heard  Pulmonary/Chest: Effort normal and breath sounds normal  He has no wheezes  He has no rales  Abdominal: Soft  Bowel sounds are normal  There is no tenderness  Musculoskeletal: He exhibits no edema  Neurological: He is alert     Psychiatric: His behavior is normal  His mood appears anxious (mild)  Vitals reviewed        Lab Results   Component Value Date    SODIUM 138 02/13/2020    K 4 1 02/13/2020     02/13/2020    CO2 24 02/13/2020    BUN 15 02/13/2020    CREATININE 0 91 02/13/2020    GLUC 91 02/13/2020    CALCIUM 9 7 02/13/2020     Lab Results   Component Value Date    YRK3ZHJQCFNW 1 158 02/13/2020

## 2020-02-18 NOTE — PATIENT INSTRUCTIONS
1  Melatonin 1mg to 5mg at bedtime  2  Continue therapy  3  Hydroxyzine as needed  4  holter monitor  5  Stay well hydrated  6   Return in 6 weeks

## 2020-02-18 NOTE — LETTER
February 18, 2020     Patient: Alysa Mcclendon   YOB: 1975   Date of Visit: 2/18/2020       To Whom it May Concern:    Alysa Mcclendon is under my professional care  He was seen in my office on 2/18/2020  Please excuse Darnell Barroso from work for medical reasons  If you have any questions or concerns, please don't hesitate to call           Sincerely,          Jalil Evangelista, DO

## 2020-03-27 ENCOUNTER — TELEPHONE (OUTPATIENT)
Dept: INTERNAL MEDICINE CLINIC | Facility: CLINIC | Age: 45
End: 2020-03-27

## 2020-04-02 ENCOUNTER — TELEMEDICINE (OUTPATIENT)
Dept: INTERNAL MEDICINE CLINIC | Facility: CLINIC | Age: 45
End: 2020-04-02
Payer: COMMERCIAL

## 2020-04-02 DIAGNOSIS — F41.1 ANXIETY AS ACUTE REACTION TO GROSS STRESS: ICD-10-CM

## 2020-04-02 DIAGNOSIS — F43.0 ANXIETY AS ACUTE REACTION TO GROSS STRESS: ICD-10-CM

## 2020-04-02 PROCEDURE — 99213 OFFICE O/P EST LOW 20 MIN: CPT | Performed by: INTERNAL MEDICINE

## 2020-04-02 RX ORDER — HYDROXYZINE PAMOATE 25 MG/1
25 CAPSULE ORAL 2 TIMES DAILY PRN
Qty: 30 CAPSULE | Refills: 0 | Status: SHIPPED | OUTPATIENT
Start: 2020-04-02 | End: 2020-04-17

## 2020-04-17 DIAGNOSIS — F41.1 ANXIETY AS ACUTE REACTION TO GROSS STRESS: ICD-10-CM

## 2020-04-17 DIAGNOSIS — F43.0 ANXIETY AS ACUTE REACTION TO GROSS STRESS: ICD-10-CM

## 2020-04-17 RX ORDER — HYDROXYZINE PAMOATE 25 MG/1
25 CAPSULE ORAL 2 TIMES DAILY PRN
Qty: 90 CAPSULE | Refills: 0 | Status: SHIPPED | OUTPATIENT
Start: 2020-04-17 | End: 2020-05-15

## 2020-05-15 DIAGNOSIS — F41.1 ANXIETY AS ACUTE REACTION TO GROSS STRESS: ICD-10-CM

## 2020-05-15 DIAGNOSIS — F43.0 ANXIETY AS ACUTE REACTION TO GROSS STRESS: ICD-10-CM

## 2020-05-15 RX ORDER — HYDROXYZINE PAMOATE 25 MG/1
25 CAPSULE ORAL 2 TIMES DAILY PRN
Qty: 60 CAPSULE | Refills: 0 | Status: SHIPPED | OUTPATIENT
Start: 2020-05-15 | End: 2020-05-29

## 2020-05-29 DIAGNOSIS — F43.0 ANXIETY AS ACUTE REACTION TO GROSS STRESS: ICD-10-CM

## 2020-05-29 DIAGNOSIS — F41.1 ANXIETY AS ACUTE REACTION TO GROSS STRESS: ICD-10-CM

## 2020-05-29 RX ORDER — HYDROXYZINE PAMOATE 25 MG/1
CAPSULE ORAL
Qty: 180 CAPSULE | Refills: 0 | Status: SHIPPED | OUTPATIENT
Start: 2020-05-29 | End: 2020-05-29

## 2020-05-29 RX ORDER — HYDROXYZINE PAMOATE 25 MG/1
25 CAPSULE ORAL 2 TIMES DAILY PRN
Qty: 180 CAPSULE | Refills: 0 | Status: SHIPPED | OUTPATIENT
Start: 2020-05-29 | End: 2022-01-18

## 2020-06-25 ENCOUNTER — OFFICE VISIT (OUTPATIENT)
Dept: INTERNAL MEDICINE CLINIC | Facility: CLINIC | Age: 45
End: 2020-06-25
Payer: COMMERCIAL

## 2020-06-25 VITALS
HEART RATE: 88 BPM | HEIGHT: 71 IN | DIASTOLIC BLOOD PRESSURE: 82 MMHG | BODY MASS INDEX: 27.86 KG/M2 | SYSTOLIC BLOOD PRESSURE: 122 MMHG | RESPIRATION RATE: 18 BRPM | WEIGHT: 199 LBS | TEMPERATURE: 98.2 F | OXYGEN SATURATION: 100 %

## 2020-06-25 DIAGNOSIS — K21.9 LARYNGOPHARYNGEAL REFLUX: ICD-10-CM

## 2020-06-25 DIAGNOSIS — Z00.00 WELLNESS EXAMINATION: Primary | ICD-10-CM

## 2020-06-25 DIAGNOSIS — L81.9 CHANGE IN PIGMENTED SKIN LESION: ICD-10-CM

## 2020-06-25 DIAGNOSIS — F41.1 ANXIETY AS ACUTE REACTION TO GROSS STRESS: ICD-10-CM

## 2020-06-25 DIAGNOSIS — Z84.81 FHX: BRCA2 GENE POSITIVE: ICD-10-CM

## 2020-06-25 DIAGNOSIS — F43.0 ANXIETY AS ACUTE REACTION TO GROSS STRESS: ICD-10-CM

## 2020-06-25 DIAGNOSIS — Z13.6 ENCOUNTER FOR LIPID SCREENING FOR CARDIOVASCULAR DISEASE: ICD-10-CM

## 2020-06-25 DIAGNOSIS — Z13.220 ENCOUNTER FOR LIPID SCREENING FOR CARDIOVASCULAR DISEASE: ICD-10-CM

## 2020-06-25 PROCEDURE — 99396 PREV VISIT EST AGE 40-64: CPT | Performed by: INTERNAL MEDICINE

## 2020-06-25 PROCEDURE — 3008F BODY MASS INDEX DOCD: CPT | Performed by: INTERNAL MEDICINE

## 2020-06-25 RX ORDER — OMEPRAZOLE 20 MG/1
20 CAPSULE, DELAYED RELEASE ORAL DAILY
Qty: 90 CAPSULE | Refills: 1 | Status: SHIPPED | OUTPATIENT
Start: 2020-06-25 | End: 2021-05-20 | Stop reason: SDUPTHER

## 2020-08-06 ENCOUNTER — TELEPHONE (OUTPATIENT)
Dept: INTERNAL MEDICINE CLINIC | Facility: CLINIC | Age: 45
End: 2020-08-06

## 2020-10-15 ENCOUNTER — TELEPHONE (OUTPATIENT)
Dept: INTERNAL MEDICINE CLINIC | Facility: CLINIC | Age: 45
End: 2020-10-15

## 2021-03-30 DIAGNOSIS — Z23 ENCOUNTER FOR IMMUNIZATION: ICD-10-CM

## 2021-04-03 ENCOUNTER — IMMUNIZATIONS (OUTPATIENT)
Dept: FAMILY MEDICINE CLINIC | Facility: HOSPITAL | Age: 46
End: 2021-04-03

## 2021-04-03 DIAGNOSIS — Z23 ENCOUNTER FOR IMMUNIZATION: Primary | ICD-10-CM

## 2021-04-03 PROCEDURE — 91300 SARS-COV-2 / COVID-19 MRNA VACCINE (PFIZER-BIONTECH) 30 MCG: CPT

## 2021-04-03 PROCEDURE — 0001A SARS-COV-2 / COVID-19 MRNA VACCINE (PFIZER-BIONTECH) 30 MCG: CPT

## 2021-04-25 ENCOUNTER — IMMUNIZATIONS (OUTPATIENT)
Dept: FAMILY MEDICINE CLINIC | Facility: HOSPITAL | Age: 46
End: 2021-04-25

## 2021-04-25 DIAGNOSIS — Z23 ENCOUNTER FOR IMMUNIZATION: Primary | ICD-10-CM

## 2021-04-25 PROCEDURE — 0002A SARS-COV-2 / COVID-19 MRNA VACCINE (PFIZER-BIONTECH) 30 MCG: CPT

## 2021-04-25 PROCEDURE — 91300 SARS-COV-2 / COVID-19 MRNA VACCINE (PFIZER-BIONTECH) 30 MCG: CPT

## 2021-05-20 DIAGNOSIS — K21.9 LARYNGOPHARYNGEAL REFLUX: ICD-10-CM

## 2021-05-20 RX ORDER — OMEPRAZOLE 20 MG/1
20 CAPSULE, DELAYED RELEASE ORAL DAILY
Qty: 90 CAPSULE | Refills: 1 | Status: SHIPPED | OUTPATIENT
Start: 2021-05-20 | End: 2022-06-23 | Stop reason: SDUPTHER

## 2021-06-24 ENCOUNTER — OFFICE VISIT (OUTPATIENT)
Dept: INTERNAL MEDICINE CLINIC | Facility: CLINIC | Age: 46
End: 2021-06-24
Payer: COMMERCIAL

## 2021-06-24 VITALS
RESPIRATION RATE: 16 BRPM | WEIGHT: 180 LBS | TEMPERATURE: 98.1 F | HEIGHT: 71 IN | HEART RATE: 68 BPM | SYSTOLIC BLOOD PRESSURE: 118 MMHG | DIASTOLIC BLOOD PRESSURE: 74 MMHG | OXYGEN SATURATION: 99 % | BODY MASS INDEX: 25.2 KG/M2

## 2021-06-24 DIAGNOSIS — Z11.59 NEED FOR HEPATITIS C SCREENING TEST: ICD-10-CM

## 2021-06-24 DIAGNOSIS — K21.9 LARYNGOPHARYNGEAL REFLUX: ICD-10-CM

## 2021-06-24 DIAGNOSIS — Z00.00 WELLNESS EXAMINATION: Primary | ICD-10-CM

## 2021-06-24 DIAGNOSIS — Z13.220 ENCOUNTER FOR LIPID SCREENING FOR CARDIOVASCULAR DISEASE: ICD-10-CM

## 2021-06-24 DIAGNOSIS — Z84.81 FHX: BRCA2 GENE POSITIVE: ICD-10-CM

## 2021-06-24 DIAGNOSIS — Z13.6 ENCOUNTER FOR LIPID SCREENING FOR CARDIOVASCULAR DISEASE: ICD-10-CM

## 2021-06-24 DIAGNOSIS — K62.9 ANAL LESION: ICD-10-CM

## 2021-06-24 DIAGNOSIS — B35.1 TOENAIL FUNGUS: ICD-10-CM

## 2021-06-24 PROCEDURE — 1036F TOBACCO NON-USER: CPT | Performed by: INTERNAL MEDICINE

## 2021-06-24 PROCEDURE — 3008F BODY MASS INDEX DOCD: CPT | Performed by: INTERNAL MEDICINE

## 2021-06-24 PROCEDURE — 99396 PREV VISIT EST AGE 40-64: CPT | Performed by: INTERNAL MEDICINE

## 2021-06-24 PROCEDURE — 3725F SCREEN DEPRESSION PERFORMED: CPT | Performed by: INTERNAL MEDICINE

## 2021-06-24 NOTE — PROGRESS NOTES
Assessment/Plan:     Diagnoses and all orders for this visit:    Wellness examination    Toenail fungus  Comments:  symptoms ongoing, will try penlac nail anti-fungal treatment  rx ordered to pharmacy  Orders:  -     Comprehensive metabolic panel; Future  -     ciclopirox (PENLAC) 8 % solution; Apply topically daily at bedtime    Anal lesion  Comments:  no lesions noted on exam today  given pt's symptoms/concern and fhx BRCA2 gene positive, will refer to colo-rectal for eval/possible colonoscopy  Orders:  -     Ambulatory referral to Colorectal Surgery; Future    Laryngopharyngeal reflux  Comments:  takes PPI every other day and symptoms controlled, c/w PPI as per ENT    FHx: BRCA2 gene positive  Comments:  genetic counselor referral entered  Orders:  -     Ambulatory Referral to Oncology Genetics; Future  -     Ambulatory referral to Colorectal Surgery; Future    Encounter for lipid screening for cardiovascular disease  -     Lipid Panel with Direct LDL reflex; Future    Need for hepatitis C screening test  Comments:  hasn't had before, ordered with today's wellness BW  Orders:  -     Hepatitis C antibody; Future      BMI Counseling: Body mass index is 25 1 kg/m²  The BMI is above normal  Exercise recommendations include exercising 3-5 times per week  Subjective:      Patient ID: Gisela Dasilva is a 50 y o  male  HPI    Here for physical and some other concerns  Lost ~20 lbs since last year, has been eating better and exercising regularly  Feeling good overall and from anxiety, rarely has sometimes anymore and not taking hydroxyzine  Willing to see genetic counselor at this time  Taking PPI but every other day for LPR with relief  C/o toenail fungus on 2nd toe of both feet for sometime, not treated in the past   Defers on oral anti-fungal therapy due to liver toxicity issues  Also c/o finding a raised lesion in his anal area  He reports it could be a hemorrhoid or a lesion but is unsure    He denies blood in stool and only occ has rectal itching  +Fhx BRCA2 gene  Never had colonoscopy in past   ROS Otherwise negative, no other complaints  History reviewed  No pertinent past medical history  Vitals:    06/24/21 0938   BP: 118/74   Pulse: 68   Resp: 16   Temp: 98 1 °F (36 7 °C)   SpO2: 99%   Weight: 81 6 kg (180 lb)   Height: 5' 11" (1 803 m)     Body mass index is 25 1 kg/m²  Current Outpatient Medications:     omeprazole (PriLOSEC) 20 mg delayed release capsule, Take 1 capsule (20 mg total) by mouth daily, Disp: 90 capsule, Rfl: 1    ciclopirox (PENLAC) 8 % solution, Apply topically daily at bedtime, Disp: 6 6 mL, Rfl: 1    hydrOXYzine pamoate (VISTARIL) 25 mg capsule, Take 1 capsule (25 mg total) by mouth 2 (two) times a day as needed for anxiety ((may cause drowsiness)) (Patient not taking: Reported on 6/25/2020), Disp: 180 capsule, Rfl: 0  No Known Allergies      Review of Systems   Constitutional: Negative for fever  HENT: Negative for congestion  Eyes: Negative for visual disturbance  Respiratory: Negative for shortness of breath  Cardiovascular: Negative for chest pain  Gastrointestinal: Negative for abdominal pain, blood in stool, diarrhea and rectal pain  Endocrine: Negative for polyuria  Genitourinary: Negative for difficulty urinating  Musculoskeletal: Negative for arthralgias  Skin: Negative for rash  Allergic/Immunologic: Negative for immunocompromised state  Neurological: Negative for weakness  Psychiatric/Behavioral: Negative for dysphoric mood  The patient is not nervous/anxious  Objective:      /74   Pulse 68   Temp 98 1 °F (36 7 °C)   Resp 16   Ht 5' 11" (1 803 m)   Wt 81 6 kg (180 lb)   SpO2 99%   BMI 25 10 kg/m²          Physical Exam  Vitals reviewed  Constitutional:       Appearance: Normal appearance  HENT:      Head: Normocephalic and atraumatic        Right Ear: Tympanic membrane normal       Left Ear: Tympanic membrane normal    Eyes:      Pupils: Pupils are equal, round, and reactive to light  Neck:      Thyroid: No thyroid tenderness  Cardiovascular:      Rate and Rhythm: Normal rate and regular rhythm  Heart sounds: No murmur heard  Pulmonary:      Effort: Pulmonary effort is normal       Breath sounds: No wheezing or rales  Abdominal:      General: Bowel sounds are normal       Palpations: Abdomen is soft  Tenderness: There is no abdominal tenderness  Genitourinary:     Rectum: No mass or external hemorrhoid  Comments: No external hemorrhoid or mass lesions noted on visual exam(digital exam deferred)  Musculoskeletal:      Right lower leg: No edema  Left lower leg: No edema  Lymphadenopathy:      Cervical: No cervical adenopathy  Skin:     Comments: 2nd toenail of both feet thickened and opaque, c/w onychomycosis   Neurological:      Mental Status: He is alert     Psychiatric:         Mood and Affect: Mood normal          Behavior: Behavior normal

## 2021-06-30 ENCOUNTER — TELEPHONE (OUTPATIENT)
Dept: HEMATOLOGY ONCOLOGY | Facility: CLINIC | Age: 46
End: 2021-06-30

## 2021-06-30 NOTE — TELEPHONE ENCOUNTER
Contacted patient 3x and left messages to return our call if interested in scheduling a [employer]  Referred by Dr Nile Gracia -- Dx - FHx: BRCA2 gene positive

## 2021-07-20 PROBLEM — K62.9 ANAL LESION: Status: ACTIVE | Noted: 2021-07-20

## 2021-07-20 PROBLEM — Z80.0 FAMILY HISTORY OF COLON CANCER: Status: ACTIVE | Noted: 2021-07-20

## 2021-07-20 PROBLEM — Z12.11 ENCOUNTER FOR SCREENING COLONOSCOPY: Status: ACTIVE | Noted: 2021-07-20

## 2021-11-09 ENCOUNTER — IMMUNIZATIONS (OUTPATIENT)
Dept: FAMILY MEDICINE CLINIC | Facility: HOSPITAL | Age: 46
End: 2021-11-09

## 2021-11-09 DIAGNOSIS — Z23 ENCOUNTER FOR IMMUNIZATION: Primary | ICD-10-CM

## 2021-11-09 PROCEDURE — 0001A COVID-19 PFIZER VACC 0.3 ML: CPT

## 2021-11-09 PROCEDURE — 91300 COVID-19 PFIZER VACC 0.3 ML: CPT

## 2022-01-13 ENCOUNTER — TELEPHONE (OUTPATIENT)
Dept: INTERNAL MEDICINE CLINIC | Facility: CLINIC | Age: 47
End: 2022-01-13

## 2022-01-17 ENCOUNTER — RA CDI HCC (OUTPATIENT)
Dept: OTHER | Facility: HOSPITAL | Age: 47
End: 2022-01-17

## 2022-01-17 NOTE — PROGRESS NOTES
[facility] 73  coding opportunities       Chart reviewed, no opportunity found: CHART REVIEWED, NO OPPORTUNITY FOUND                        Patients insurance company: [employer] (Medicare Advantage and Commercial)

## 2022-01-18 ENCOUNTER — OFFICE VISIT (OUTPATIENT)
Dept: INTERNAL MEDICINE CLINIC | Facility: CLINIC | Age: 47
End: 2022-01-18
Payer: COMMERCIAL

## 2022-01-18 VITALS
RESPIRATION RATE: 16 BRPM | DIASTOLIC BLOOD PRESSURE: 72 MMHG | TEMPERATURE: 98.5 F | HEART RATE: 73 BPM | HEIGHT: 71 IN | BODY MASS INDEX: 24.53 KG/M2 | WEIGHT: 175.2 LBS | SYSTOLIC BLOOD PRESSURE: 120 MMHG | OXYGEN SATURATION: 99 %

## 2022-01-18 DIAGNOSIS — N52.9 ERECTILE DYSFUNCTION, UNSPECIFIED ERECTILE DYSFUNCTION TYPE: Primary | ICD-10-CM

## 2022-01-18 DIAGNOSIS — Z30.09 VASECTOMY EVALUATION: ICD-10-CM

## 2022-01-18 PROBLEM — Z12.11 ENCOUNTER FOR SCREENING COLONOSCOPY: Status: RESOLVED | Noted: 2021-07-20 | Resolved: 2022-01-18

## 2022-01-18 PROBLEM — K62.9 ANAL LESION: Status: RESOLVED | Noted: 2021-07-20 | Resolved: 2022-01-18

## 2022-01-18 PROCEDURE — 3725F SCREEN DEPRESSION PERFORMED: CPT | Performed by: INTERNAL MEDICINE

## 2022-01-18 PROCEDURE — 1036F TOBACCO NON-USER: CPT | Performed by: INTERNAL MEDICINE

## 2022-01-18 PROCEDURE — 99214 OFFICE O/P EST MOD 30 MIN: CPT | Performed by: INTERNAL MEDICINE

## 2022-01-18 PROCEDURE — 3008F BODY MASS INDEX DOCD: CPT | Performed by: INTERNAL MEDICINE

## 2022-01-18 RX ORDER — TADALAFIL 5 MG/1
5 TABLET ORAL DAILY PRN
COMMUNITY
End: 2022-01-18 | Stop reason: SDUPTHER

## 2022-01-18 RX ORDER — TADALAFIL 5 MG/1
5 TABLET ORAL DAILY PRN
Qty: 90 TABLET | Refills: 0 | Status: SHIPPED | OUTPATIENT
Start: 2022-01-18 | End: 2022-06-23 | Stop reason: SDUPTHER

## 2022-01-18 NOTE — PROGRESS NOTES
Assessment/Plan:     Diagnoses and all orders for this visit:    Erectile dysfunction, unspecified erectile dysfunction type  Comments:  cialis ordered, patient has taken before and tolerated  BW re-printed and reminded Gladis Waggoner to complete  f/u as scheduled or prn  Orders:  -     TSH, 3rd generation with Free T4 reflex  -     tadalafil (CIALIS) 5 MG tablet; Take 1 tablet (5 mg total) by mouth daily as needed for erectile dysfunction    Vasectomy evaluation  -     Ambulatory Referral to Urology; Future    Other orders  -     Discontinue: tadalafil (CIALIS) 5 MG tablet; Take 5 mg by mouth daily as needed          Subjective:      Patient ID: Jia Bernardo is a 48 y o  male  HPI    Here with c/o ED symptoms  Gladis Waggoner has no problem with libido and ejaculation has not had any major issues lately  He is a single father and is starting to go back to the dating scene  He tried cialis 5mg with some benefit and no AE except some mild joint pains afterwards  He takes no medications on a regular basis except PPI every other day  He lost weight over the last 1-2 yrs by exercising/walking  He feels well otherwise and is considering having a vasectomy, would like to d/w urology  ROS Otherwise negative, no other complaints  History reviewed  No pertinent past medical history  Vitals:    01/18/22 1025   BP: 120/72   Pulse: 73   Resp: 16   Temp: 98 5 °F (36 9 °C)   SpO2: 99%   Weight: 79 5 kg (175 lb 3 2 oz)   Height: 5' 11" (1 803 m)     Body mass index is 24 44 kg/m²  Current Outpatient Medications:     omeprazole (PriLOSEC) 20 mg delayed release capsule, Take 1 capsule (20 mg total) by mouth daily, Disp: 90 capsule, Rfl: 1    tadalafil (CIALIS) 5 MG tablet, Take 1 tablet (5 mg total) by mouth daily as needed for erectile dysfunction, Disp: 90 tablet, Rfl: 0  No Known Allergies      Review of Systems   Constitutional: Negative for fever  HENT: Negative for congestion  Eyes: Negative for visual disturbance  Respiratory: Negative for shortness of breath  Cardiovascular: Negative for chest pain  Gastrointestinal: Negative for abdominal pain  Endocrine: Negative for polyuria  Genitourinary: Negative for difficulty urinating  Musculoskeletal: Positive for arthralgias (at times after walking with relief on rest/stretching)  Skin: Negative for rash  Allergic/Immunologic: Negative for immunocompromised state  Neurological: Negative for weakness  Psychiatric/Behavioral: Negative for dysphoric mood  Objective:      /72   Pulse 73   Temp 98 5 °F (36 9 °C)   Resp 16   Ht 5' 11" (1 803 m)   Wt 79 5 kg (175 lb 3 2 oz)   SpO2 99%   BMI 24 44 kg/m²          Physical Exam  Vitals reviewed  Constitutional:       Appearance: Normal appearance  HENT:      Head: Normocephalic and atraumatic  Right Ear: Tympanic membrane normal       Left Ear: Tympanic membrane normal    Eyes:      Conjunctiva/sclera: Conjunctivae normal    Cardiovascular:      Rate and Rhythm: Normal rate and regular rhythm  Heart sounds: No murmur heard  Pulmonary:      Effort: Pulmonary effort is normal       Breath sounds: No wheezing or rales  Abdominal:      General: Bowel sounds are normal       Palpations: Abdomen is soft  Tenderness: There is no abdominal tenderness  Musculoskeletal:      Right lower leg: No edema  Left lower leg: No edema  Neurological:      Mental Status: He is alert  Mental status is at baseline     Psychiatric:         Mood and Affect: Mood normal          Behavior: Behavior normal

## 2022-06-23 ENCOUNTER — OFFICE VISIT (OUTPATIENT)
Dept: INTERNAL MEDICINE CLINIC | Facility: CLINIC | Age: 47
End: 2022-06-23
Payer: COMMERCIAL

## 2022-06-23 VITALS
WEIGHT: 174 LBS | RESPIRATION RATE: 18 BRPM | TEMPERATURE: 98 F | BODY MASS INDEX: 24.36 KG/M2 | OXYGEN SATURATION: 97 % | SYSTOLIC BLOOD PRESSURE: 120 MMHG | DIASTOLIC BLOOD PRESSURE: 62 MMHG | HEIGHT: 71 IN | HEART RATE: 102 BPM

## 2022-06-23 DIAGNOSIS — Z11.3 SCREEN FOR STD (SEXUALLY TRANSMITTED DISEASE): ICD-10-CM

## 2022-06-23 DIAGNOSIS — N52.9 ERECTILE DYSFUNCTION, UNSPECIFIED ERECTILE DYSFUNCTION TYPE: ICD-10-CM

## 2022-06-23 DIAGNOSIS — Z13.6 ENCOUNTER FOR LIPID SCREENING FOR CARDIOVASCULAR DISEASE: ICD-10-CM

## 2022-06-23 DIAGNOSIS — Z00.00 WELLNESS EXAMINATION: Primary | ICD-10-CM

## 2022-06-23 DIAGNOSIS — R68.82 DECREASED LIBIDO: ICD-10-CM

## 2022-06-23 DIAGNOSIS — Z84.81 FHX: BRCA2 GENE POSITIVE: ICD-10-CM

## 2022-06-23 DIAGNOSIS — Z13.220 ENCOUNTER FOR LIPID SCREENING FOR CARDIOVASCULAR DISEASE: ICD-10-CM

## 2022-06-23 DIAGNOSIS — B00.1 RECURRENT COLD SORES: ICD-10-CM

## 2022-06-23 DIAGNOSIS — Z11.59 NEED FOR HEPATITIS C SCREENING TEST: ICD-10-CM

## 2022-06-23 DIAGNOSIS — Z01.84 IMMUNITY STATUS TESTING: ICD-10-CM

## 2022-06-23 DIAGNOSIS — K21.9 LARYNGOPHARYNGEAL REFLUX: ICD-10-CM

## 2022-06-23 PROBLEM — Z86.16 HISTORY OF 2019 NOVEL CORONAVIRUS DISEASE (COVID-19): Status: ACTIVE | Noted: 2022-06-23

## 2022-06-23 PROCEDURE — 3725F SCREEN DEPRESSION PERFORMED: CPT | Performed by: INTERNAL MEDICINE

## 2022-06-23 PROCEDURE — 1036F TOBACCO NON-USER: CPT | Performed by: INTERNAL MEDICINE

## 2022-06-23 PROCEDURE — 3008F BODY MASS INDEX DOCD: CPT | Performed by: INTERNAL MEDICINE

## 2022-06-23 PROCEDURE — 99396 PREV VISIT EST AGE 40-64: CPT | Performed by: INTERNAL MEDICINE

## 2022-06-23 RX ORDER — OMEPRAZOLE 20 MG/1
20 CAPSULE, DELAYED RELEASE ORAL DAILY
Qty: 90 CAPSULE | Refills: 1 | Status: SHIPPED | OUTPATIENT
Start: 2022-06-23

## 2022-06-23 RX ORDER — TADALAFIL 5 MG/1
5 TABLET ORAL DAILY
Qty: 90 TABLET | Refills: 0 | Status: SHIPPED | OUTPATIENT
Start: 2022-06-23

## 2022-06-23 RX ORDER — VALACYCLOVIR HYDROCHLORIDE 1 G/1
2000 TABLET, FILM COATED ORAL 2 TIMES DAILY
Qty: 12 TABLET | Refills: 0 | Status: SHIPPED | OUTPATIENT
Start: 2022-06-23 | End: 2022-06-24

## 2022-06-23 NOTE — PROGRESS NOTES
Assessment/Plan:     Diagnoses and all orders for this visit:    Wellness examination    Laryngopharyngeal reflux  Comments:  taking PPI every other day as his symptoms improved with weight loss, c/w rx  Orders:  -     omeprazole (PriLOSEC) 20 mg delayed release capsule; Take 1 capsule (20 mg total) by mouth daily  -     CBC and differential    Recurrent cold sores  Comments:  valtrex re-ordered to take prn  Orders:  -     valACYclovir (VALTREX) 1,000 mg tablet; Take 2 tablets (2,000 mg total) by mouth 2 (two) times a day for 1 day    Erectile dysfunction, unspecified erectile dysfunction type  Comments:  trial of cialis every day  check testosterone levels  if the above in-effective & BW WNL -> t/c sildenafil trial vs urology eval  Orders:  -     Comprehensive metabolic panel; Future  -     TSH, 3rd generation with Free T4 reflex  -     tadalafil (CIALIS) 5 MG tablet; Take 1 tablet (5 mg total) by mouth daily  -     CBC and differential    Decreased libido  -     Testosterone; Future    Immunity status testing  -     Hepatitis B surface antibody; Future    FHx: BRCA2 gene positive  Comments:  genetic counseling, ref provided again today  Orders:  -     Ambulatory Referral to Oncology Genetics; Future    Need for hepatitis C screening test  -     Hepatitis C antibody; Future    Screen for STD (sexually transmitted disease)  -     Hepatitis B surface antigen  -     Chlamydia/GC amplified DNA by PCR; Future  -     RPR; Future  -     HIV 1/2 Antigen/Antibody (4th Generation) w Reflex [facility]; Future    Encounter for lipid screening for cardiovascular disease  -     Lipid Panel with Direct LDL reflex; Future          Subjective:      Patient ID: Gloria German is a 48 y o  male  HPI     Here for physical, takes no medications on a regular basis except omeprazole every other day and cialis prn   5mg cialis dose not fully effective and he tried taking 10mg and 15mg dose but it did not help(15mg dose causing flushing)    He has decreased libido lately  He did not complete BW  He would like to try once a day dosing for cialis  He is a single father and has started dating again recently  He requests STD screening tests  He requests valtrex refill for recurrent cold sores  He hasn't seen genetic counselor but would like to do so now  He recently had COVID-19 but has since recovered  ROS otherwise negative, no other complaints  History reviewed  No pertinent past medical history  Vitals:    06/23/22 0929   BP: 120/62   BP Location: Left arm   Patient Position: Sitting   Cuff Size: Adult   Pulse: 102   Resp: 18   Temp: 98 °F (36 7 °C)   TempSrc: Tympanic   SpO2: 97%   Weight: 78 9 kg (174 lb)   Height: 5' 11" (1 803 m)     Body mass index is 24 27 kg/m²  Current Outpatient Medications:     omeprazole (PriLOSEC) 20 mg delayed release capsule, Take 1 capsule (20 mg total) by mouth daily, Disp: 90 capsule, Rfl: 1    tadalafil (CIALIS) 5 MG tablet, Take 1 tablet (5 mg total) by mouth daily, Disp: 90 tablet, Rfl: 0    valACYclovir (VALTREX) 1,000 mg tablet, Take 2 tablets (2,000 mg total) by mouth 2 (two) times a day for 1 day, Disp: 12 tablet, Rfl: 0  No Known Allergies      Review of Systems   Constitutional: Negative for fever  HENT: Negative for congestion  Eyes: Negative for visual disturbance  Respiratory: Negative for shortness of breath  Cardiovascular: Negative for chest pain  Gastrointestinal: Negative for abdominal pain  Endocrine: Negative for polyuria  Genitourinary: Negative for dysuria and penile discharge  Musculoskeletal: Negative for arthralgias  Skin: Negative for rash  Allergic/Immunologic: Negative for immunocompromised state  Neurological: Negative for dizziness  Psychiatric/Behavioral: Negative for dysphoric mood           Objective:      /62 (BP Location: Left arm, Patient Position: Sitting, Cuff Size: Adult)   Pulse 102   Temp 98 °F (36 7 °C) (Tympanic)   Resp 18  5' 11" (1 803 m)   Wt 78 9 kg (174 lb)   SpO2 97%   BMI 24 27 kg/m²          Physical Exam  Vitals reviewed  Constitutional:       Appearance: Normal appearance  HENT:      Head: Normocephalic and atraumatic  Right Ear: Tympanic membrane normal       Left Ear: Tympanic membrane normal    Eyes:      Conjunctiva/sclera: Conjunctivae normal    Cardiovascular:      Rate and Rhythm: Normal rate and regular rhythm  Heart sounds: No murmur heard  Pulmonary:      Effort: Pulmonary effort is normal       Breath sounds: No wheezing or rales  Abdominal:      General: Bowel sounds are normal       Palpations: Abdomen is soft  Tenderness: There is no abdominal tenderness  Musculoskeletal:      Right lower leg: No edema  Left lower leg: No edema  Neurological:      Mental Status: He is alert  Mental status is at baseline     Psychiatric:         Mood and Affect: Mood normal          Behavior: Behavior normal

## 2022-06-27 ENCOUNTER — APPOINTMENT (OUTPATIENT)
Dept: LAB | Facility: CLINIC | Age: 47
End: 2022-06-27
Payer: COMMERCIAL

## 2022-06-27 DIAGNOSIS — N52.9 ERECTILE DYSFUNCTION, UNSPECIFIED ERECTILE DYSFUNCTION TYPE: ICD-10-CM

## 2022-06-27 DIAGNOSIS — Z11.3 SCREEN FOR STD (SEXUALLY TRANSMITTED DISEASE): ICD-10-CM

## 2022-06-27 DIAGNOSIS — Z11.59 NEED FOR HEPATITIS C SCREENING TEST: ICD-10-CM

## 2022-06-27 DIAGNOSIS — R68.82 DECREASED LIBIDO: ICD-10-CM

## 2022-06-27 DIAGNOSIS — Z01.84 IMMUNITY STATUS TESTING: ICD-10-CM

## 2022-06-27 DIAGNOSIS — Z13.6 ENCOUNTER FOR LIPID SCREENING FOR CARDIOVASCULAR DISEASE: ICD-10-CM

## 2022-06-27 DIAGNOSIS — Z13.220 ENCOUNTER FOR LIPID SCREENING FOR CARDIOVASCULAR DISEASE: ICD-10-CM

## 2022-06-27 LAB
ALBUMIN SERPL BCP-MCNC: 3.9 G/DL (ref 3.5–5)
ALP SERPL-CCNC: 70 U/L (ref 46–116)
ALT SERPL W P-5'-P-CCNC: 28 U/L (ref 12–78)
ANION GAP SERPL CALCULATED.3IONS-SCNC: 7 MMOL/L (ref 4–13)
AST SERPL W P-5'-P-CCNC: 21 U/L (ref 5–45)
BASOPHILS # BLD AUTO: 0.01 THOUSANDS/ΜL (ref 0–0.1)
BASOPHILS NFR BLD AUTO: 0 % (ref 0–1)
BILIRUB SERPL-MCNC: 0.84 MG/DL (ref 0.2–1)
BUN SERPL-MCNC: 12 MG/DL (ref 5–25)
CALCIUM SERPL-MCNC: 9.5 MG/DL (ref 8.3–10.1)
CHLORIDE SERPL-SCNC: 100 MMOL/L (ref 100–108)
CHOLEST SERPL-MCNC: 213 MG/DL
CO2 SERPL-SCNC: 28 MMOL/L (ref 21–32)
CREAT SERPL-MCNC: 1.02 MG/DL (ref 0.6–1.3)
EOSINOPHIL # BLD AUTO: 0.03 THOUSAND/ΜL (ref 0–0.61)
EOSINOPHIL NFR BLD AUTO: 1 % (ref 0–6)
ERYTHROCYTE [DISTWIDTH] IN BLOOD BY AUTOMATED COUNT: 11.9 % (ref 11.6–15.1)
GFR SERPL CREATININE-BSD FRML MDRD: 86 ML/MIN/1.73SQ M
GLUCOSE P FAST SERPL-MCNC: 98 MG/DL (ref 65–99)
HBV SURFACE AB SER-ACNC: <3.1 MIU/ML
HBV SURFACE AG SER QL: NORMAL
HCT VFR BLD AUTO: 49.2 % (ref 36.5–49.3)
HCV AB SER QL: NORMAL
HDLC SERPL-MCNC: 74 MG/DL
HGB BLD-MCNC: 16 G/DL (ref 12–17)
IMM GRANULOCYTES # BLD AUTO: 0.01 THOUSAND/UL (ref 0–0.2)
IMM GRANULOCYTES NFR BLD AUTO: 0 % (ref 0–2)
LDLC SERPL CALC-MCNC: 126 MG/DL (ref 0–100)
LYMPHOCYTES # BLD AUTO: 1.35 THOUSANDS/ΜL (ref 0.6–4.47)
LYMPHOCYTES NFR BLD AUTO: 25 % (ref 14–44)
MCH RBC QN AUTO: 29.5 PG (ref 26.8–34.3)
MCHC RBC AUTO-ENTMCNC: 32.5 G/DL (ref 31.4–37.4)
MCV RBC AUTO: 91 FL (ref 82–98)
MONOCYTES # BLD AUTO: 0.37 THOUSAND/ΜL (ref 0.17–1.22)
MONOCYTES NFR BLD AUTO: 7 % (ref 4–12)
NEUTROPHILS # BLD AUTO: 3.58 THOUSANDS/ΜL (ref 1.85–7.62)
NEUTS SEG NFR BLD AUTO: 67 % (ref 43–75)
NRBC BLD AUTO-RTO: 0 /100 WBCS
PLATELET # BLD AUTO: 204 THOUSANDS/UL (ref 149–390)
PMV BLD AUTO: 10.5 FL (ref 8.9–12.7)
POTASSIUM SERPL-SCNC: 4.4 MMOL/L (ref 3.5–5.3)
PROT SERPL-MCNC: 7.9 G/DL (ref 6.4–8.2)
RBC # BLD AUTO: 5.43 MILLION/UL (ref 3.88–5.62)
RPR SER QL: NORMAL
SODIUM SERPL-SCNC: 135 MMOL/L (ref 136–145)
TESTOST SERPL-MCNC: 725 NG/DL (ref 113–1065)
TRIGL SERPL-MCNC: 63 MG/DL
TSH SERPL DL<=0.05 MIU/L-ACNC: 1.46 UIU/ML (ref 0.45–4.5)
WBC # BLD AUTO: 5.35 THOUSAND/UL (ref 4.31–10.16)

## 2022-06-27 PROCEDURE — 85025 COMPLETE CBC W/AUTO DIFF WBC: CPT | Performed by: INTERNAL MEDICINE

## 2022-06-27 PROCEDURE — 36415 COLL VENOUS BLD VENIPUNCTURE: CPT | Performed by: INTERNAL MEDICINE

## 2022-06-27 PROCEDURE — 87389 HIV-1 AG W/HIV-1&-2 AB AG IA: CPT

## 2022-06-27 PROCEDURE — 87591 N.GONORRHOEAE DNA AMP PROB: CPT

## 2022-06-27 PROCEDURE — 86592 SYPHILIS TEST NON-TREP QUAL: CPT

## 2022-06-27 PROCEDURE — 84403 ASSAY OF TOTAL TESTOSTERONE: CPT

## 2022-06-27 PROCEDURE — 87340 HEPATITIS B SURFACE AG IA: CPT | Performed by: INTERNAL MEDICINE

## 2022-06-27 PROCEDURE — 86706 HEP B SURFACE ANTIBODY: CPT

## 2022-06-27 PROCEDURE — 86803 HEPATITIS C AB TEST: CPT

## 2022-06-27 PROCEDURE — 80061 LIPID PANEL: CPT

## 2022-06-27 PROCEDURE — 80053 COMPREHEN METABOLIC PANEL: CPT

## 2022-06-27 PROCEDURE — 84443 ASSAY THYROID STIM HORMONE: CPT | Performed by: INTERNAL MEDICINE

## 2022-06-27 PROCEDURE — 87491 CHLMYD TRACH DNA AMP PROBE: CPT

## 2022-06-28 ENCOUNTER — TELEPHONE (OUTPATIENT)
Dept: GENETICS | Facility: CLINIC | Age: 47
End: 2022-06-28

## 2022-06-28 LAB
C TRACH DNA SPEC QL NAA+PROBE: NEGATIVE
HIV 1+2 AB+HIV1 P24 AG SERPL QL IA: NORMAL
N GONORRHOEA DNA SPEC QL NAA+PROBE: NEGATIVE

## 2022-06-28 NOTE — TELEPHONE ENCOUNTER
I called Josias Song to schedule a new patient appointment with the [facility]       Outcome:   I left a voice message encouraging the patient to call the genetics team at [phone] to schedule this appointment  Follow-up:   At this time the referral will be closed and we will wait to hear back from the patient regarding scheduling this appointment

## 2022-08-30 ENCOUNTER — OFFICE VISIT (OUTPATIENT)
Dept: UROLOGY | Facility: CLINIC | Age: 47
End: 2022-08-30
Payer: COMMERCIAL

## 2022-08-30 VITALS
WEIGHT: 176 LBS | SYSTOLIC BLOOD PRESSURE: 120 MMHG | DIASTOLIC BLOOD PRESSURE: 70 MMHG | BODY MASS INDEX: 24.64 KG/M2 | HEIGHT: 71 IN

## 2022-08-30 DIAGNOSIS — Z30.2 ENCOUNTER FOR STERILIZATION: Primary | ICD-10-CM

## 2022-08-30 PROCEDURE — 99203 OFFICE O/P NEW LOW 30 MIN: CPT | Performed by: PHYSICIAN ASSISTANT

## 2022-08-30 RX ORDER — ALPRAZOLAM 1 MG/1
TABLET ORAL
Qty: 2 TABLET | Refills: 0 | Status: SHIPPED | OUTPATIENT
Start: 2022-08-30

## 2022-08-30 NOTE — PROGRESS NOTES
Referring Physician: Jeanne Cowden, DO  A copy of this consultation note was communicated to the referring physician  Diagnoses and all orders for this visit:    Encounter for sterilization  -     ALPRAZolam (XANAX) 1 mg tablet; Take both tablets 1 hour prior to the procedure            Assessment and plan:       We had a long discussion regarding the options for birth control  I told the patient that vasectomy is considered to be a permanent surgical sterilization procedure  We spoke about other options including the possibility of vasectomy reversal at a later time  He understands that vasectomy confers no immunity to STDs  I also told him that according to our present knowledge, there is no causal relationship between vasectomy and subsequent development of prostate cancer or testicular cancer  No change in libido erection or ejaculation  We spoke about the potential complications  The most common one in the short term is scrotal hematoma and infection, which rarely requires re-operation  Additionally, he can react to the anesthetic, develop scrotal swelling, have pain or skin bruising  We spoke about post procedure care to try to minimize this complication  I also asked him to refrain from aspirin or fish oil products and alcohol prior to the procedure  The long-term complications include but are not limited to vasectomy failure by recanalization, chronic epididymal discomfort, pain, among other possibilities  I described to him how this procedure is normally performed in an office setting and he understands that if anesthesia is desired, this can be performed for him in an outpatient operative setting  Finally, he understands that following vasectomy, hell need to use other means of birth control until hes had semen analyses that demonstrate the absence of sperm  He understands it will be his responsibility to submit these semen specimens and call our office for the results   I told him again that recanalization is a small but real possibility, and if he is ever concerned about it he can submit another semen specimen for analysis  After discussing the risks, benefits, possible complications and alternatives, informed consents were obtained  Diazepam was prescribed, and review dosing, adverse effects and timing of the procedure  He will return in the near future for the procedure  Chief Complaint     Desire for vasectomy      History of Present Illness     Aruna Fields is a 48 y o  male referred for evaluation of vasectomy  He has 1 biological children  He states that he and his partner have come to the mutual decision they do not desire any additional children  He has no prior past medical, past surgical, or past urologic history    Detailed Urologic History     - please refer to HPI    Review of Systems     Review of Systems   Constitutional: Negative for activity change and fatigue  HENT: Negative for congestion  Eyes: Negative for visual disturbance  Respiratory: Negative for shortness of breath and wheezing  Cardiovascular: Negative for chest pain and leg swelling  Gastrointestinal: Negative for abdominal pain  Endocrine: Negative for polyuria  Genitourinary: Negative for dysuria, flank pain, hematuria and urgency  Musculoskeletal: Negative for back pain  Allergic/Immunologic: Negative for immunocompromised state  Neurological: Negative for dizziness and numbness  Psychiatric/Behavioral: Negative for dysphoric mood  All other systems reviewed and are negative  Allergies     No Known Allergies    Physical Exam     Physical Exam   Constitutional: He is oriented to person, place, and time  He appears well-developed and well-nourished  No distress  HENT:   Head: Normocephalic and atraumatic  Eyes: EOM are normal    Neck: Normal range of motion     Cardiovascular:   Negative lower extremity edema   Pulmonary/Chest: Effort normal and breath sounds normal    Abdominal: Soft  Genitourinary:   Genitourinary Comments: Vas deferens are palpable bilaterally  Musculoskeletal: Normal range of motion  Neurological: He is alert and oriented to person, place, and time  Skin: Skin is warm  Psychiatric: He has a normal mood and affect  His behavior is normal          Vital Signs  There were no vitals filed for this visit  Current Medications       Current Outpatient Medications:     omeprazole (PriLOSEC) 20 mg delayed release capsule, Take 1 capsule (20 mg total) by mouth daily, Disp: 90 capsule, Rfl: 1    tadalafil (CIALIS) 5 MG tablet, Take 1 tablet (5 mg total) by mouth daily, Disp: 90 tablet, Rfl: 0    valACYclovir (VALTREX) 1,000 mg tablet, Take 2 tablets (2,000 mg total) by mouth 2 (two) times a day for 1 day, Disp: 12 tablet, Rfl: 0      Active Problems     Patient Active Problem List   Diagnosis    Laryngopharyngeal reflux    Change in pigmented skin lesion    Dyslipidemia    FHx: BRCA2 gene positive    Family history of colon cancer    Erectile dysfunction    History of 2019 novel coronavirus disease (COVID-19)         Past Medical History     No past medical history on file        Surgical History     Past Surgical History:   Procedure Laterality Date    TONSILLECTOMY           Family History     Family History   Problem Relation Age of Onset    Depression Mother     Anxiety disorder Mother     Hyperparathyroidism Mother         parathryoidectomy    Breast cancer Mother 77        BRCA2 positive    Asthma Father     Lymphoma Father 79        NHL    Depression Sister     Anxiety disorder Sister     Pancreatic cancer Maternal Grandmother     Emphysema Paternal Grandfather         smoker    Alcohol abuse Neg Hx     Substance Abuse Neg Hx     Mental illness Neg Hx          Social History     Social History     Social History     Tobacco Use   Smoking Status Never Smoker   Smokeless Tobacco Never Used       Portions of the record may have been created with voice recognition software  Occasional wrong word or "sound a like" substitutions may have occurred due to the inherent limitations of voice recognition software  Read the chart carefully and recognize, using context, where substitutions have occurred

## 2022-09-14 ENCOUNTER — TELEPHONE (OUTPATIENT)
Dept: UROLOGY | Facility: AMBULATORY SURGERY CENTER | Age: 47
End: 2022-09-14

## 2022-09-14 NOTE — TELEPHONE ENCOUNTER
Pt under care of [facility]    Pt called and left  stating he would like the price of vas procedure    PT call back-[phone]

## 2022-11-15 ENCOUNTER — PROCEDURE VISIT (OUTPATIENT)
Dept: UROLOGY | Facility: CLINIC | Age: 47
End: 2022-11-15

## 2022-11-15 VITALS
BODY MASS INDEX: 25.62 KG/M2 | OXYGEN SATURATION: 98 % | HEART RATE: 68 BPM | WEIGHT: 183 LBS | HEIGHT: 71 IN | DIASTOLIC BLOOD PRESSURE: 84 MMHG | SYSTOLIC BLOOD PRESSURE: 132 MMHG

## 2022-11-15 DIAGNOSIS — Z30.2 ENCOUNTER FOR STERILIZATION: Primary | ICD-10-CM

## 2022-11-15 NOTE — PROGRESS NOTES
Procedures      No Scalpel Vasectomy Procedure Note    Indications: 48 y o   y o  male desiring permanent sterilization    Pre-operative Diagnosis: Undesired fertility    Post-operative Diagnosis: Undesired fertility    Anesthesia: Lidocaine 2% without epinephrine      Procedure Details     The risks and benefits of the procedure were discussed at the pre-procedure consultation, and written, informed consent obtained  Premedicated with Valium 10 mgm po 60 minutes prior to procedure  The scrotum was palpated with both testes normal in size and position, no masses palpitated  The scrotum was cleansed with warm Betadine and draped in the usual sterile manner  A vasal sheath block was performed on both the left and right vas  After adequate anesthesia was established, a small perforation was made in the skin and the left vas was isolated with the ring forceps, dissected free and delivered through the skin perforation  The left vas was divided, approximately 1 5 cm portion removed, and each end of the vas was cauterized and suture ligated  The ends of the vas were replaced in the scrotum through the puncture site  The right vas was then isolated, divided, cauterized and ligated in a similar fashion  Midportions removed were sent to pathology to confirm  Any bleeding was controlled with electrocautery  The puncture site was dry when the procedure was completed  After discussion with the patient, the puncture site was sutured using single 5-0 chromic suture in figure 8 fashion  Specimen:   1  Right vas deferens  2  Left vas deferens    Condition: Stable    Complications: none    Plan:        He did very well with the procedure today  Postprocedural instructions were provided, including instructions, scheduling information, and the specimen cup for his postprocedural hemodialysis    He was instructed to perform this at 6 weeks and 2 call my office after the specimen is submitted to review the results by phone  He was instructed to continue his current methods of contraception until that time

## 2023-01-06 ENCOUNTER — OFFICE VISIT (OUTPATIENT)
Dept: INTERNAL MEDICINE CLINIC | Facility: CLINIC | Age: 48
End: 2023-01-06

## 2023-01-06 VITALS
HEIGHT: 71 IN | HEART RATE: 88 BPM | OXYGEN SATURATION: 98 % | RESPIRATION RATE: 19 BRPM | SYSTOLIC BLOOD PRESSURE: 120 MMHG | DIASTOLIC BLOOD PRESSURE: 62 MMHG | BODY MASS INDEX: 25.2 KG/M2 | WEIGHT: 180 LBS | TEMPERATURE: 97.6 F

## 2023-01-06 DIAGNOSIS — Z13.6 ENCOUNTER FOR LIPID SCREENING FOR CARDIOVASCULAR DISEASE: ICD-10-CM

## 2023-01-06 DIAGNOSIS — Z13.220 ENCOUNTER FOR LIPID SCREENING FOR CARDIOVASCULAR DISEASE: ICD-10-CM

## 2023-01-06 DIAGNOSIS — B00.1 RECURRENT COLD SORES: ICD-10-CM

## 2023-01-06 DIAGNOSIS — K21.9 LARYNGOPHARYNGEAL REFLUX: Primary | ICD-10-CM

## 2023-01-06 RX ORDER — OMEPRAZOLE 20 MG/1
20 CAPSULE, DELAYED RELEASE ORAL DAILY
Qty: 90 CAPSULE | Refills: 1 | Status: SHIPPED | OUTPATIENT
Start: 2023-01-06

## 2023-01-06 RX ORDER — VALACYCLOVIR HYDROCHLORIDE 1 G/1
2000 TABLET, FILM COATED ORAL 2 TIMES DAILY
Qty: 12 TABLET | Refills: 1 | Status: SHIPPED | OUTPATIENT
Start: 2023-01-06 | End: 2023-01-07

## 2023-01-06 NOTE — PROGRESS NOTES
Name: Suze Yi      : 1975      MRN: [id number]  Encounter Provider: Cally Queen DO  Encounter Date: 2023   Encounter department: [place] [address]  Laryngopharyngeal reflux  Comments:  takes PPI every other day with relief  Orders:  -     omeprazole (PriLOSEC) 20 mg delayed release capsule; Take 1 capsule (20 mg total) by mouth daily  -     Comprehensive metabolic panel; Future; Expected date: 2023    2  Recurrent cold sores  Comments:  takes valtrex as needed with relief, c/w rx  Orders:  -     valACYclovir (VALTREX) 1,000 mg tablet; Take 2 tablets (2,000 mg total) by mouth 2 (two) times a day for 1 day    3  Encounter for lipid screening for cardiovascular disease  -     Lipid Panel with Direct LDL reflex; Future; Expected date: 2023      BMI Counseling: Body mass index is 25 1 kg/m²  The BMI is above normal  Exercise recommendations include exercising 3-5 times per week  Rationale for BMI follow-up plan is due to patient being overweight or obese  Depression Screening and Follow-up Plan: Patient was screened for depression during today's encounter  They screened negative with a PHQ-2 score of 0  Advised skin on scrotum that he was concerned about appears benign  He will monitor for skin changes/pain/redness and have it re-evaluated should any changes occur  Subjective      HPI     Here for follow up, [place] is overall doing well  He is taking his medication for LPR and valtrex as needed  He is UTD On flu vaccine  He went on his trip to [place] and [place] and had no issues with anxiety  He had vasectomy and has since recovered  He noticed a bump in his scrotum that is painless and was there before vasectomy  It remains unchanged as well  ROS Otherwise negative, no other complaints  Review of Systems   Constitutional: Negative for fever  HENT: Negative for congestion  Eyes: Negative for visual disturbance  Respiratory: Negative for shortness of breath  Cardiovascular: Negative for chest pain  Gastrointestinal: Negative for abdominal pain  Endocrine: Negative for polyuria  Genitourinary: Negative for difficulty urinating  Musculoskeletal: Negative for arthralgias  Skin:        Skin lesion -> scrotum   Allergic/Immunologic: Negative for immunocompromised state  Neurological: Negative for dizziness  Psychiatric/Behavioral: Negative for dysphoric mood  Current Outpatient Medications on File Prior to Visit   Medication Sig   • ALPRAZolam (XANAX) 1 mg tablet Take both tablets 1 hour prior to the procedure   • [DISCONTINUED] omeprazole (PriLOSEC) 20 mg delayed release capsule Take 1 capsule (20 mg total) by mouth daily   • [DISCONTINUED] valACYclovir (VALTREX) 1,000 mg tablet Take 2 tablets (2,000 mg total) by mouth 2 (two) times a day for 1 day (Patient not taking: Reported on 8/30/2022)       Objective     /62 (BP Location: Left arm, Patient Position: Sitting, Cuff Size: Adult)   Pulse 88   Temp 97 6 °F (36 4 °C) (Tympanic)   Resp 19   Ht 5' 11" (1 803 m)   Wt 81 6 kg (180 lb)   SpO2 98%   BMI 25 10 kg/m²     Physical Exam  Vitals reviewed  Constitutional:       General: He is not in acute distress  Appearance: Normal appearance  HENT:      Head: Normocephalic and atraumatic  Right Ear: Tympanic membrane normal       Left Ear: Tympanic membrane normal    Eyes:      Conjunctiva/sclera: Conjunctivae normal    Cardiovascular:      Rate and Rhythm: Normal rate and regular rhythm  Heart sounds: No murmur heard  Pulmonary:      Effort: Pulmonary effort is normal       Breath sounds: No wheezing or rales  Abdominal:      General: Bowel sounds are normal       Palpations: Abdomen is soft  Tenderness: There is no abdominal tenderness  Genitourinary:      Musculoskeletal:      Cervical back: Normal range of motion  Right lower leg: No edema        Left lower leg: No edema  Neurological:      Mental Status: He is alert  Mental status is at baseline     Psychiatric:         Mood and Affect: Mood normal          Behavior: Behavior normal        Kwaku Salamanca DO

## 2023-01-13 ENCOUNTER — APPOINTMENT (OUTPATIENT)
Dept: LAB | Facility: CLINIC | Age: 48
End: 2023-01-13

## 2023-01-13 ENCOUNTER — TELEPHONE (OUTPATIENT)
Dept: UROLOGY | Facility: CLINIC | Age: 48
End: 2023-01-13

## 2023-01-13 DIAGNOSIS — Z30.2 ENCOUNTER FOR STERILIZATION: ICD-10-CM

## 2023-01-13 LAB
DEPRECATED CD4 CELLS/CD8 CELLS BLD: 3.5 ML
SPERM MOTILE SMN QL MICRO: NORMAL

## 2023-01-13 NOTE — TELEPHONE ENCOUNTER
Left message on voicemail (OK per comm consent) informing patient he is clear and able to stop using birth control  Call office with questions

## 2023-01-13 NOTE — TELEPHONE ENCOUNTER
----- Message from Kelli Wayne MD sent at 1/13/2023  9:47 AM EST -----  Please notify he is clear    ----- Message -----  From: Lab, Background User  Sent: 1/13/2023   8:39 AM EST  To: Kleli Wayne MD

## 2023-06-19 ENCOUNTER — RA CDI HCC (OUTPATIENT)
Dept: OTHER | Facility: HOSPITAL | Age: 48
End: 2023-06-19

## 2023-06-19 NOTE — PROGRESS NOTES
[facility]  coding opportunities       Chart reviewed, no opportunity found: CHART REVIEWED, NO OPPORTUNITY FOUND        Patients Insurance        Commercial Insurance: Lauri Mercedes

## 2023-07-24 ENCOUNTER — OFFICE VISIT (OUTPATIENT)
Dept: INTERNAL MEDICINE CLINIC | Facility: CLINIC | Age: 48
End: 2023-07-24
Payer: COMMERCIAL

## 2023-07-24 VITALS
SYSTOLIC BLOOD PRESSURE: 122 MMHG | BODY MASS INDEX: 25.05 KG/M2 | WEIGHT: 179.6 LBS | HEART RATE: 77 BPM | OXYGEN SATURATION: 98 % | TEMPERATURE: 97.8 F | DIASTOLIC BLOOD PRESSURE: 82 MMHG

## 2023-07-24 DIAGNOSIS — K21.9 LARYNGOPHARYNGEAL REFLUX: ICD-10-CM

## 2023-07-24 DIAGNOSIS — S86.012A STRAIN OF LEFT ACHILLES TENDON, INITIAL ENCOUNTER: Primary | ICD-10-CM

## 2023-07-24 PROCEDURE — 99213 OFFICE O/P EST LOW 20 MIN: CPT | Performed by: INTERNAL MEDICINE

## 2023-07-24 RX ORDER — OMEPRAZOLE 20 MG/1
20 CAPSULE, DELAYED RELEASE ORAL DAILY
Qty: 90 CAPSULE | Refills: 3 | Status: SHIPPED | OUTPATIENT
Start: 2023-07-24

## 2023-07-24 NOTE — PROGRESS NOTES
Name: Moses Cramer      : 1975      MRN: [id number]  Encounter Provider: Corky Lay DO  Encounter Date: 2023   Encounter department: [address] [place] [address]     1. Strain of left Achilles tendon, initial encounter  Comments:  doing better, c/w light exercise/avoid running or aggravating activities. if not improved, t/c x-ray +/- podiatry eval    2. Laryngopharyngeal reflux  Comments:  he is taking PPI every other day with relief and will try taking every 3rd day for now and monitor for symptoms  Orders:  -     omeprazole (PriLOSEC) 20 mg delayed release capsule; Take 1 capsule (20 mg total) by mouth daily        Depression Screening and Follow-up Plan: Patient was screened for depression during today's encounter. They screened negative with a PHQ-2 score of 0. Subjective      HPI     Here for follow up, Dona Boston is overall doing well. He is taking omeprazole every other day and his LPR symptoms are controlled. He c/o left heel pain after going for a spontaneous run outdoors some weeks ago. No fall/injury and pain has improved over recent weeks. He has no pain with walking now and only has slight pain with going down steps. He did not complete BW yet. He is seeing a therapist which has helped his anxiety a great deal.  He is in a relationship for about 1 yr now as well. ROS otherwise negative, no other complaints. Review of Systems   Constitutional: Negative for fever. HENT: Negative for congestion. Eyes: Negative for visual disturbance. Respiratory: Negative for shortness of breath. Cardiovascular: Negative for chest pain. Gastrointestinal: Negative for abdominal pain. Endocrine: Negative for polyuria. Genitourinary: Negative for difficulty urinating. Musculoskeletal: Positive for arthralgias. Skin: Negative for rash. Allergic/Immunologic: Negative for immunocompromised state. Neurological: Negative for dizziness. Psychiatric/Behavioral: Negative for dysphoric mood. The patient is not nervous/anxious. Current Outpatient Medications on File Prior to Visit   Medication Sig   • ALPRAZolam (XANAX) 1 mg tablet Take both tablets 1 hour prior to the procedure   • [DISCONTINUED] omeprazole (PriLOSEC) 20 mg delayed release capsule Take 1 capsule (20 mg total) by mouth daily   • valACYclovir (VALTREX) 1,000 mg tablet Take 2 tablets (2,000 mg total) by mouth 2 (two) times a day for 1 day       Objective     /82 (BP Location: Left arm, Patient Position: Sitting, Cuff Size: Standard)   Pulse 77   Temp 97.8 °F (36.6 °C)   Wt 81.5 kg (179 lb 9.6 oz)   SpO2 98%   BMI 25.05 kg/m²     Physical Exam  Vitals reviewed. Constitutional:       General: He is not in acute distress. HENT:      Head: Normocephalic and atraumatic. Right Ear: Tympanic membrane normal.      Left Ear: Tympanic membrane normal.   Eyes:      Conjunctiva/sclera: Conjunctivae normal.   Cardiovascular:      Rate and Rhythm: Normal rate and regular rhythm. Heart sounds: No murmur heard. Pulmonary:      Effort: Pulmonary effort is normal.      Breath sounds: No wheezing or rales. Abdominal:      General: Bowel sounds are normal.      Palpations: Abdomen is soft. Tenderness: There is no abdominal tenderness. Musculoskeletal:      Right lower leg: No edema. Left lower leg: No edema. Left foot: Normal range of motion (ROM normal and against resistance). Tenderness (slight in postero-lateral heel but no swelling or bruising) present. Neurological:      Mental Status: He is alert. Mental status is at baseline.    Psychiatric:         Mood and Affect: Mood normal.         Behavior: Behavior normal.       Kwaku Salamanca DO

## 2023-09-05 NOTE — TELEPHONE ENCOUNTER
[place] called and is asking if you can prescribe Tadalafil? This would be a new medication for him  Dutasteride Counseling: Dustasteride Counseling:  I discussed with the patient the risks of use of dutasteride including but not limited to decreased libido, decreased ejaculate volume, and gynecomastia. Women who can become pregnant should not handle medication.  All of the patient's questions and concerns were addressed. Dutasteride Male Counseling: Dustasteride Counseling:  I discussed with the patient the risks of use of dutasteride including but not limited to decreased libido, decreased ejaculate volume, and gynecomastia. Women who can become pregnant should not handle medication.  All of the patient's questions and concerns were addressed.

## 2024-01-09 ENCOUNTER — TELEPHONE (OUTPATIENT)
Dept: INTERNAL MEDICINE CLINIC | Facility: CLINIC | Age: 49
End: 2024-01-09

## 2024-01-09 DIAGNOSIS — B00.1 RECURRENT COLD SORES: ICD-10-CM

## 2024-01-09 RX ORDER — VALACYCLOVIR HYDROCHLORIDE 1 G/1
2000 TABLET, FILM COATED ORAL 2 TIMES DAILY
Qty: 12 TABLET | Refills: 1 | Status: SHIPPED | OUTPATIENT
Start: 2024-01-09 | End: 2024-01-09

## 2024-01-09 RX ORDER — VALACYCLOVIR HYDROCHLORIDE 1 G/1
2000 TABLET, FILM COATED ORAL 2 TIMES DAILY
Qty: 12 TABLET | Refills: 1 | Status: SHIPPED | OUTPATIENT
Start: 2024-01-09 | End: 2024-01-10

## 2024-01-09 NOTE — TELEPHONE ENCOUNTER
Message was left      Hi, my name is Randy Bridges. I'm a patient of [facility]. I am traveling in [place]. I literally just arrived in [place] and I feel a cold sore coming on so I was hoping that doctor I would be able to call in a prescription for me while I'm here because I didn't bring my cold sore medication with me. My phone number is [phone]. Thank you so much. Bye.      [facility]

## 2024-02-07 ENCOUNTER — TELEPHONE (OUTPATIENT)
Dept: INTERNAL MEDICINE CLINIC | Facility: CLINIC | Age: 49
End: 2024-02-07

## 2024-02-07 DIAGNOSIS — Z13.6 ENCOUNTER FOR LIPID SCREENING FOR CARDIOVASCULAR DISEASE: ICD-10-CM

## 2024-02-07 DIAGNOSIS — Z13.220 ENCOUNTER FOR LIPID SCREENING FOR CARDIOVASCULAR DISEASE: ICD-10-CM

## 2024-02-07 DIAGNOSIS — K21.9 LARYNGOPHARYNGEAL REFLUX: Primary | ICD-10-CM

## 2024-02-07 NOTE — TELEPHONE ENCOUNTER
Hi, my name is Randy Bridges. My YOB: 1975 and I am a patient of [facility] and realized that I never completed my lab work for a comprehensive metabolic panel and lipid panel with direct LDL reflex is supposed to do this apparently in June. My appointment, my annual physical with doctor is scheduled for July 25th asking can I take my script that I have here and have this blood work done or do I need to have it reissued? So if you can call me back [phone]. Thank you.

## 2024-02-07 NOTE — TELEPHONE ENCOUNTER
BW re-ordered:    Orders Placed This Encounter   Procedures    Comprehensive metabolic panel    Lipid Panel with Direct LDL reflex     thanks

## 2024-02-09 ENCOUNTER — APPOINTMENT (OUTPATIENT)
Dept: LAB | Facility: CLINIC | Age: 49
End: 2024-02-09
Payer: COMMERCIAL

## 2024-02-09 DIAGNOSIS — Z13.220 ENCOUNTER FOR LIPID SCREENING FOR CARDIOVASCULAR DISEASE: ICD-10-CM

## 2024-02-09 DIAGNOSIS — K21.9 LARYNGOPHARYNGEAL REFLUX: ICD-10-CM

## 2024-02-09 DIAGNOSIS — Z13.6 ENCOUNTER FOR LIPID SCREENING FOR CARDIOVASCULAR DISEASE: ICD-10-CM

## 2024-02-09 LAB
ALBUMIN SERPL BCP-MCNC: 4.5 G/DL (ref 3.5–5)
ALP SERPL-CCNC: 69 U/L (ref 34–104)
ALT SERPL W P-5'-P-CCNC: 29 U/L (ref 7–52)
ANION GAP SERPL CALCULATED.3IONS-SCNC: 10 MMOL/L
AST SERPL W P-5'-P-CCNC: 25 U/L (ref 13–39)
BILIRUB SERPL-MCNC: 0.86 MG/DL (ref 0.2–1)
BUN SERPL-MCNC: 14 MG/DL (ref 5–25)
CALCIUM SERPL-MCNC: 9.9 MG/DL (ref 8.4–10.2)
CHLORIDE SERPL-SCNC: 99 MMOL/L (ref 96–108)
CHOLEST SERPL-MCNC: 224 MG/DL
CO2 SERPL-SCNC: 30 MMOL/L (ref 21–32)
CREAT SERPL-MCNC: 1.02 MG/DL (ref 0.6–1.3)
GFR SERPL CREATININE-BSD FRML MDRD: 85 ML/MIN/1.73SQ M
GLUCOSE P FAST SERPL-MCNC: 89 MG/DL (ref 65–99)
HDLC SERPL-MCNC: 73 MG/DL
LDLC SERPL CALC-MCNC: 134 MG/DL (ref 0–100)
POTASSIUM SERPL-SCNC: 4.1 MMOL/L (ref 3.5–5.3)
PROT SERPL-MCNC: 7.5 G/DL (ref 6.4–8.4)
SODIUM SERPL-SCNC: 139 MMOL/L (ref 135–147)
TRIGL SERPL-MCNC: 85 MG/DL

## 2024-02-09 PROCEDURE — 36415 COLL VENOUS BLD VENIPUNCTURE: CPT

## 2024-02-09 PROCEDURE — 80053 COMPREHEN METABOLIC PANEL: CPT

## 2024-02-09 PROCEDURE — 80061 LIPID PANEL: CPT

## 2024-04-15 ENCOUNTER — TELEPHONE (OUTPATIENT)
Dept: UROLOGY | Facility: CLINIC | Age: 49
End: 2024-04-15

## 2024-04-15 ENCOUNTER — OFFICE VISIT (OUTPATIENT)
Dept: UROLOGY | Facility: CLINIC | Age: 49
End: 2024-04-15
Payer: COMMERCIAL

## 2024-04-15 VITALS
HEIGHT: 72 IN | BODY MASS INDEX: 26.01 KG/M2 | DIASTOLIC BLOOD PRESSURE: 82 MMHG | SYSTOLIC BLOOD PRESSURE: 120 MMHG | WEIGHT: 192 LBS | OXYGEN SATURATION: 99 % | HEART RATE: 70 BPM

## 2024-04-15 DIAGNOSIS — N50.82 SCROTAL PAIN: Primary | ICD-10-CM

## 2024-04-15 DIAGNOSIS — R21 RASH: ICD-10-CM

## 2024-04-15 DIAGNOSIS — Z12.5 SCREENING FOR PROSTATE CANCER: ICD-10-CM

## 2024-04-15 PROCEDURE — 99213 OFFICE O/P EST LOW 20 MIN: CPT

## 2024-04-15 RX ORDER — CLOTRIMAZOLE 1 %
CREAM (GRAM) TOPICAL 2 TIMES DAILY
Qty: 14 G | Refills: 0 | Status: SHIPPED | OUTPATIENT
Start: 2024-04-15

## 2024-04-15 NOTE — PROGRESS NOTES
Office Visit- Urology  Randy Bridges 1975 MRN: [id number]      Assessment/Discussion/Plan    48 y.o. 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  There is also more fullness in the left inguinal region versus the right which is potentially representative of a hernia  -Further evaluation with ultrasound the scrotum and testicles  -Will call patient with results  -In the meantime for discomfort and for suspected sperm granuloma utilization of ibuprofen 800 mg 3 times daily for 3 days and 400 mg twice daily for 4 days after.  Advised patient to utilize with food    2.  Erectile dysfunction  -Potentially multifactorial with mild hypertension, hyperlipidemia, psychogenic factor.  After conversation the patient I believe that psychogenic factors are most at play given his blood pressure/hyperlipidemia is only mildly present and that sometimes he is able to achieve an erection without medication and other times even with medication and the result is less than ideal but that associate these episodes he states that he is worrying about the effect/quality of erection  -He does follow with a sex therapist at this point in time  -Continue to utilize Cialis as needed    3.  Pubic rash  -Area of increased erythema on the left aspect of the pubic area.  Completely asymptomatic  -Trial course of clotrimazole.  If no improvement referral to dermatology    Chief Complaint:   Randy is a 48 y.o. male presenting to the office for a follow up visit regarding left scrotal pain        Subjective    Patient is a 48-year-old male with a past urologic history of a vasectomy in November 2022 presents to the office for follow-up as over the past couple weeks he has been having discomfort in the left hemiscrotum.  He states that he is able to feel what he feels is a nodule at the upper aspect of the scrotum.  The pain has  been more consistent but is not significant where is affecting his quality of life or his ability to perform activities.  He denies associated urinary symptoms.  He has not had this pain before.  He does not recall any provoking or inciting event.  He also has a history of erectile dysfunction that is managed with Cialis 5 mg.  He states that the rectal dysfunction is often situational for sometimes it is significantly better to the point where he does not need medication whereas other times even with medication he has difficulty.  He does follow with a sex therapist      AUA SYMPTOM SCORE      Flowsheet Row Most Recent Value   AUA SYMPTOM SCORE    How often have you had a sensation of not emptying your bladder completely after you finished urinating? 0 (P)    How often have you had to urinate again less than two hours after you finished urinating? 0 (P)    How often have you found you stopped and started again several times when you urinate? 0 (P)    How often have you found it difficult to postpone urination? 0 (P)    How often have you had a weak urinary stream? 0 (P)    How often have you had to push or strain to begin urination? 1 (P)    How many times did you most typically get up to urinate from the time you went to bed at night until the time you got up in the morning? 1 (P)    Quality of Life: If you were to spend the rest of your life with your urinary condition just the way it is now, how would you feel about that? 1 (P)    AUA SYMPTOM SCORE 2 (P)             ROS:   Review of Systems   Constitutional: Negative.  Negative for chills, fatigue and fever.   HENT: Negative.     Respiratory:  Negative for shortness of breath.    Cardiovascular:  Negative for chest pain.   Gastrointestinal: Negative.  Negative for abdominal pain.   Endocrine: Negative.    Musculoskeletal: Negative.    Skin: Negative.    Neurological: Negative.  Negative for dizziness and light-headedness.   Hematological: Negative.     Psychiatric/Behavioral: Negative.           Past Medical History  Past Medical History:   Diagnosis Date    Anxiety     GERD (gastroesophageal reflux disease)        Past Surgical History  Past Surgical History:   Procedure Laterality Date    TONSILLECTOMY         Past Family History  Family History   Problem Relation Age of Onset    Depression Mother     Anxiety disorder Mother     Hyperparathyroidism Mother         parathryoidectomy    Breast cancer Mother 66        BRCA2 positive    Thyroid disease Mother     Breast cancer additional onset Mother         Does additional onset mean getting breast cancer more than once? If so, then I can uncheck this box.    Asthma Father     Lymphoma Father 67        NHL    Depression Sister     Anxiety disorder Sister     Pancreatic cancer Maternal Grandmother     Emphysema Paternal Grandfather         smoker    Alcohol abuse Neg Hx     Substance Abuse Neg Hx     Mental illness Neg Hx        Past Social history  Social History     Socioeconomic History    Marital status: Single     Spouse name: Not on file    Number of children: Not on file    Years of education: Not on file    Highest education level: Not on file   Occupational History    Not on file   Tobacco Use    Smoking status: Never    Smokeless tobacco: Never   Vaping Use    Vaping status: Never Used   Substance and Sexual Activity    Alcohol use: Yes     Alcohol/week: 4.0 standard drinks of alcohol     Types: 4 Standard drinks or equivalent per week    Drug use: No    Sexual activity: Yes     Partners: Female     Birth control/protection: Male Sterilization   Other Topics Concern    Not on file   Social History Narrative    Drinks coffee (2-16 oz cup/day- Mon-Fri)     Social Determinants of Health     Financial Resource Strain: Not on file   Food Insecurity: Not on file   Transportation Needs: Not on file   Physical Activity: Not on file   Stress: Not on file   Social Connections: Not on file   Intimate Partner  Violence: Not on file   Housing Stability: Not on file       Current Medications  Current Outpatient Medications   Medication Sig Dispense Refill    omeprazole (PriLOSEC) 20 mg delayed release capsule Take 1 capsule (20 mg total) by mouth daily 90 capsule 3    ALPRAZolam (XANAX) 1 mg tablet Take both tablets 1 hour prior to the procedure (Patient not taking: Reported on 4/15/2024) 2 tablet 0    valACYclovir (VALTREX) 1,000 mg tablet Take 2 tablets (2,000 mg total) by mouth 2 (two) times a day for 1 day 12 tablet 1     No current facility-administered medications for this visit.       Allergies  No Known Allergies    OBJECTIVE    Vitals   Vitals:    04/15/24 1029   BP: 120/82   BP Location: Left arm   Patient Position: Sitting   Cuff Size: Standard   Pulse: 70   SpO2: 99%   Weight: 87.1 kg (192 lb)   Height: 6' (1.829 m)       PVR:    Physical Exam  Constitutional:       General: He is not in acute distress.     Appearance: Normal appearance. He is normal weight. He is not ill-appearing or toxic-appearing.   HENT:      Head: Normocephalic and atraumatic.   Eyes:      Conjunctiva/sclera: Conjunctivae normal.   Cardiovascular:      Rate and Rhythm: Normal rate.   Pulmonary:      Effort: Pulmonary effort is normal. No respiratory distress.   Genitourinary:     Comments: Erythematous circular rash nonraised on the left aspect of the pubic area.      No significant swelling or erythema noted on the scrotum itself.  Testes palpably normal.  Appreciation of suspected left sperm granuloma on left aspect.  Left fullness in the left inguinal region versus right  Skin:     General: Skin is warm and dry.   Neurological:      General: No focal deficit present.      Mental Status: He is alert and oriented to person, place, and time.      Cranial Nerves: No cranial nerve deficit.   Psychiatric:         Mood and Affect: Mood normal.         Behavior: Behavior normal.         Thought Content: Thought content normal.          Labs:  "    Lab Results   Component Value Date    CREATININE 1.02 02/09/2024      No results found for: \"HGBA1C\"  Lab Results   Component Value Date    CALCIUM 9.9 02/09/2024    K 4.1 02/09/2024    CO2 30 02/09/2024    CL 99 02/09/2024    BUN 14 02/09/2024    CREATININE 1.02 02/09/2024       I have personally reviewed all pertinent lab results and reviewed with patient    Imaging       Juan Hayden PA-C  Date: 4/15/2024 Time: 10:36 AM  [facility]    This note was written using fluency dictation software. Please excuse any resulting minor grammatical errors.     "

## 2024-04-17 ENCOUNTER — HOSPITAL ENCOUNTER (OUTPATIENT)
Dept: ULTRASOUND IMAGING | Facility: HOSPITAL | Age: 49
Discharge: HOME | End: 2024-04-17
Payer: COMMERCIAL

## 2024-04-17 DIAGNOSIS — N50.82 SCROTAL PAIN: ICD-10-CM

## 2024-04-17 PROCEDURE — 76870 US EXAM SCROTUM: CPT

## 2024-05-09 ENCOUNTER — TELEPHONE (OUTPATIENT)
Dept: OTHER | Facility: HOSPITAL | Age: 49
End: 2024-05-09

## 2024-05-09 NOTE — TELEPHONE ENCOUNTER
Please call patient to inform him that I reviewed the images with Dr. Koehler and this nodule is likely representative of a sperm granuloma given his history of vasectomy.  We will continue to monitor.  Patient to have a follow-up in 6 months with Dr. Koehler for repeat examination.

## 2024-05-10 NOTE — TELEPHONE ENCOUNTER
Patient returned missed call.    Pt will be awaiting a call back to discuss results.    Call back [phone]

## 2024-05-13 NOTE — TELEPHONE ENCOUNTER
Patient called stating he is returning the call from the office .  He keeps missing the call. He has been in meetings and has not been able to answer at the time he was called.  He stated try to call him back he will try to answer it.      Patient can be reached [phone]

## 2024-05-13 NOTE — TELEPHONE ENCOUNTER
Returned pt's call and left voice message for pt to give the office a call back. Call back number provided.

## 2024-05-14 NOTE — TELEPHONE ENCOUNTER
Called and left  for PT, stating I have put him on a recall list, when Dr Koehler schedule opens up, we will schedule appt. Also left him a CB number.

## 2024-05-14 NOTE — TELEPHONE ENCOUNTER
Spoke with pt. He was relayed the following message:    Juan Hayden PA-C Physician Assistant Signed 5/9/2024     Copy     Please call patient to inform him that I reviewed the images with Dr. Koehler and this nodule is likely representative of a sperm granuloma given his history of vasectomy.  We will continue to monitor.  Patient to have a follow-up in 6 months with Dr. Koehler for repeat examination.          *Attempted to schedule pt with Dr. Koehler in 6-months for repeat examination but schedule does not seem to be booked out that far. Please contact pt and assist with scheduling. Thank you.

## 2024-07-10 ENCOUNTER — PATIENT MESSAGE (OUTPATIENT)
Dept: UROLOGY | Facility: CLINIC | Age: 49
End: 2024-07-10

## 2024-07-10 DIAGNOSIS — N52.9 ERECTILE DYSFUNCTION, UNSPECIFIED ERECTILE DYSFUNCTION TYPE: Primary | ICD-10-CM

## 2024-07-11 RX ORDER — TADALAFIL 5 MG/1
5 TABLET ORAL DAILY PRN
Qty: 90 TABLET | Refills: 1 | Status: SHIPPED | OUTPATIENT
Start: 2024-07-11

## 2024-07-12 ENCOUNTER — RA CDI HCC (OUTPATIENT)
Dept: OTHER | Facility: HOSPITAL | Age: 49
End: 2024-07-12

## 2024-07-25 ENCOUNTER — OFFICE VISIT (OUTPATIENT)
Dept: INTERNAL MEDICINE CLINIC | Facility: CLINIC | Age: 49
End: 2024-07-25
Payer: COMMERCIAL

## 2024-07-25 VITALS
BODY MASS INDEX: 24.68 KG/M2 | WEIGHT: 182 LBS | DIASTOLIC BLOOD PRESSURE: 78 MMHG | HEART RATE: 76 BPM | OXYGEN SATURATION: 99 % | TEMPERATURE: 98 F | SYSTOLIC BLOOD PRESSURE: 118 MMHG

## 2024-07-25 DIAGNOSIS — Z12.5 SCREENING FOR PROSTATE CANCER: ICD-10-CM

## 2024-07-25 DIAGNOSIS — K21.9 LARYNGOPHARYNGEAL REFLUX: ICD-10-CM

## 2024-07-25 DIAGNOSIS — Z00.00 WELLNESS EXAMINATION: Primary | ICD-10-CM

## 2024-07-25 DIAGNOSIS — Z84.81 FHX: BRCA2 GENE POSITIVE: ICD-10-CM

## 2024-07-25 DIAGNOSIS — E78.5 DYSLIPIDEMIA: ICD-10-CM

## 2024-07-25 DIAGNOSIS — N52.9 ERECTILE DYSFUNCTION, UNSPECIFIED ERECTILE DYSFUNCTION TYPE: ICD-10-CM

## 2024-07-25 DIAGNOSIS — R07.89 ATYPICAL CHEST PAIN: ICD-10-CM

## 2024-07-25 PROCEDURE — 99396 PREV VISIT EST AGE 40-64: CPT | Performed by: INTERNAL MEDICINE

## 2024-07-25 NOTE — PROGRESS NOTES
Adult Annual Physical  Name: Randy Bridges      : 1975      MRN: [id number]  Encounter Provider: Kwaku Salamanca DO  Encounter Date: 2024   Encounter department: [facility] INTERNAL MEDICINE    Assessment & Plan   1. Wellness examination  2. Erectile dysfunction, unspecified erectile dysfunction type  Comments:  stable, c/w cialis prn  Orders:  -     Stress test only, exercise; Future; Expected date: 2024  3. Atypical chest pain  Comments:  given risk factors -> exercise stress test to r/o ACS  Orders:  -     Stress test only, exercise; Future; Expected date: 2024  4. Dyslipidemia  Comments:  with low ASCVD risk score, he will work on lifestyle changes to bring this down  Orders:  -     Stress test only, exercise; Future; Expected date: 2024  -     CT coronary calcium score; Future; Expected date: 2024  -     Comprehensive metabolic panel; Future; Expected date: 2025  -     Lipid Panel with Direct LDL reflex; Future; Expected date: 2025  5. Laryngopharyngeal reflux  Comments:  taking PPI daily, c/w rx  6. FHx: BRCA2 gene positive  -     Ambulatory Referral to Oncology Genetics; Future  7. Screening for prostate cancer  -     PSA, Total Screen; Future; Expected date: 2025    Immunizations and preventive care screenings were discussed with patient today. Appropriate education was printed on patient's after visit summary.    Discussed risks and benefits of prostate cancer screening. We discussed the controversial history of PSA screening for prostate cancer in the [place] as well as the risk of over detection and over treatment of prostate cancer by way of PSA screening.  The patient understands that PSA blood testing is an imperfect way to screen for prostate cancer and that elevated PSA levels in the blood may also be caused by infection, inflammation, prostatic trauma or manipulation, urological procedures, or by benign prostatic  enlargement.    The role of the digital rectal examination in prostate cancer screening was also discussed and I discussed with him that there is large interobserver variability in the findings of digital rectal examination.    Counseling:  Exercise: the importance of regular exercise/physical activity was discussed. Recommend exercise 3-5 times per week for at least 30 minutes.       Depression Screening and Follow-up Plan: Patient was screened for depression during today's encounter. They screened negative with a PHQ-2 score of 0.        History of Present Illness     Adult Annual Physical:  Patient presents for annual physical.     Diet and Physical Activity:  - Diet/Nutrition: well balanced diet.  - Exercise: moderate cardiovascular exercise.    Depression Screening:  - PHQ-2 Score: 0    General Health:    - Vision: wears glasses.     Health:    - Urinary symptoms: erectile dysfunction.     Here for annual physical, Randy is overall doing well.  He is taking PPI daily and cialis as needed.  He would like to meet with genetic counselor due to fhx BRCA gene positivitiy.  He is UTD on preventative care.  He has some left sided chest discomfort from time to time.  It is not associated with activity.  He had a near syncope episode s/p ER eval in 2020.  He is concerned about underlying heart disease.  He eats a balanced diet but does have 1-2 eggs per day.  He normally exercises regularly but hasn't done so recently.  He continues to see a therapist for anxiety which has been helping.  ROS Otherwise negative, no other complaints.    Review of Systems   Constitutional:  Negative for fever.   HENT:  Negative for congestion.    Eyes:  Negative for visual disturbance.   Respiratory:  Negative for shortness of breath.    Cardiovascular:  Positive for chest pain (atypical).   Gastrointestinal:  Negative for abdominal pain.   Endocrine: Negative for polyuria.   Genitourinary:  Negative for difficulty urinating.    Musculoskeletal:  Negative for gait problem.   Skin:  Negative for rash.   Allergic/Immunologic: Negative for immunocompromised state.   Neurological:  Negative for dizziness.   Psychiatric/Behavioral:  Negative for dysphoric mood.      Current Outpatient Medications on File Prior to Visit   Medication Sig Dispense Refill    clotrimazole (LOTRIMIN) 1 % cream Apply topically 2 (two) times a day 14 g 0    omeprazole (PriLOSEC) 20 mg delayed release capsule Take 1 capsule (20 mg total) by mouth daily 90 capsule 3    tadalafil (CIALIS) 5 MG tablet Take 1 tablet (5 mg total) by mouth daily as needed for erectile dysfunction 90 tablet 1    valACYclovir (VALTREX) 1,000 mg tablet Take 2 tablets (2,000 mg total) by mouth 2 (two) times a day for 1 day 12 tablet 1     No current facility-administered medications on file prior to visit.        Objective     /78 (BP Location: Left arm, Patient Position: Sitting, Cuff Size: Standard)   Pulse 76   Temp 98 °F (36.7 °C)   Wt 82.6 kg (182 lb)   SpO2 99%   BMI 24.68 kg/m²     Physical Exam  Vitals reviewed.   Constitutional:       General: He is not in acute distress.     Appearance: Normal appearance. He is normal weight.   HENT:      Head: Normocephalic and atraumatic.      Right Ear: Tympanic membrane normal.      Left Ear: Tympanic membrane normal.      Mouth/Throat:      Mouth: Mucous membranes are moist.   Eyes:      Conjunctiva/sclera: Conjunctivae normal.   Cardiovascular:      Rate and Rhythm: Normal rate and regular rhythm.      Heart sounds: No murmur heard.  Pulmonary:      Effort: Pulmonary effort is normal.      Breath sounds: No wheezing or rales.   Abdominal:      General: Abdomen is flat. Bowel sounds are normal.      Palpations: Abdomen is soft.      Tenderness: There is no abdominal tenderness.   Musculoskeletal:      Cervical back: Neck supple.      Right lower leg: No edema.      Left lower leg: No edema.   Neurological:      Mental Status: He is  alert. Mental status is at baseline.   Psychiatric:         Mood and Affect: Mood normal.         Behavior: Behavior normal.

## 2024-08-14 ENCOUNTER — HOSPITAL ENCOUNTER (OUTPATIENT)
Dept: CT IMAGING | Facility: HOSPITAL | Age: 49
Discharge: HOME | End: 2024-08-14
Attending: INTERNAL MEDICINE
Payer: COMMERCIAL

## 2024-08-14 DIAGNOSIS — E78.5 DYSLIPIDEMIA: ICD-10-CM

## 2024-08-14 PROCEDURE — 75571 CT HRT W/O DYE W/CA TEST: CPT

## 2024-08-22 ENCOUNTER — TELEPHONE (OUTPATIENT)
Age: 49
End: 2024-08-22

## 2024-08-22 DIAGNOSIS — Q24.5 ANOMALOUS RIGHT CORONARY ARTERY: Primary | ICD-10-CM

## 2024-08-30 DIAGNOSIS — K21.9 LARYNGOPHARYNGEAL REFLUX: ICD-10-CM

## 2024-11-06 ENCOUNTER — HOSPITAL ENCOUNTER (OUTPATIENT)
Dept: NON INVASIVE DIAGNOSTICS | Facility: HOSPITAL | Age: 49
Discharge: HOME | End: 2024-11-06
Attending: INTERNAL MEDICINE
Payer: COMMERCIAL

## 2024-11-06 DIAGNOSIS — E78.5 DYSLIPIDEMIA: ICD-10-CM

## 2024-11-06 DIAGNOSIS — R07.89 ATYPICAL CHEST PAIN: ICD-10-CM

## 2024-11-06 DIAGNOSIS — N52.9 ERECTILE DYSFUNCTION, UNSPECIFIED ERECTILE DYSFUNCTION TYPE: ICD-10-CM

## 2024-11-06 LAB
CHEST PAIN STATEMENT: NORMAL
CHEST PAIN STATEMENT: NORMAL
MAX DIASTOLIC BP: 80 MMHG
MAX DIASTOLIC BP: 80 MMHG
MAX HR PERCENT: 103 %
MAX HR: 176 BPM
MAX PREDICTED HEART RATE: 170 BPM
MAX PREDICTED HEART RATE: 170 BPM
PROTOCOL NAME: NORMAL
PROTOCOL NAME: NORMAL
RATE PRESSURE PRODUCT: NORMAL
SL CV STRESS RECOVERY BP: NORMAL MMHG
SL CV STRESS RECOVERY HR: 107 BPM
SL CV STRESS RECOVERY O2 SAT: 98 %
SL CV STRESS STAGE REACHED: 4
STRESS ANGINA INDEX: 0
STRESS BASELINE BP: NORMAL MMHG
STRESS BASELINE HR: 97 BPM
STRESS O2 SAT REST: 98 %
STRESS PEAK HR: 176 BPM
STRESS POST ESTIMATED WORKLOAD: 11.7 METS
STRESS POST EXERCISE DUR MIN: 10 MIN
STRESS POST EXERCISE DUR SEC: 0 SEC
STRESS POST EXERCISE DUR SEC: 1 SEC
STRESS POST EXERCISE DUR SEC: 1 SEC
STRESS POST O2 SAT PEAK: 97 %
STRESS POST PEAK BP: 178 MMHG
STRESS POST PEAK HR: 176 BPM
STRESS POST PEAK HR: 176 BPM
STRESS POST PEAK SYSTOLIC BP: 178 MMHG
STRESS POST PEAK SYSTOLIC BP: 178 MMHG
TARGET HR FORMULA: NORMAL
TARGET HR FORMULA: NORMAL
TEST INDICATION: NORMAL
TEST INDICATION: NORMAL

## 2024-11-06 PROCEDURE — 93017 CV STRESS TEST TRACING ONLY: CPT

## 2024-11-06 PROCEDURE — 93016 CV STRESS TEST SUPVJ ONLY: CPT | Performed by: INTERNAL MEDICINE

## 2024-11-06 PROCEDURE — 93018 CV STRESS TEST I&R ONLY: CPT | Performed by: INTERNAL MEDICINE

## 2024-11-14 ENCOUNTER — CONSULT (OUTPATIENT)
Dept: CARDIOLOGY CLINIC | Facility: CLINIC | Age: 49
End: 2024-11-14
Payer: COMMERCIAL

## 2024-11-14 VITALS
OXYGEN SATURATION: 97 % | DIASTOLIC BLOOD PRESSURE: 86 MMHG | BODY MASS INDEX: 25.65 KG/M2 | RESPIRATION RATE: 16 BRPM | HEIGHT: 72 IN | SYSTOLIC BLOOD PRESSURE: 124 MMHG | WEIGHT: 189.4 LBS | HEART RATE: 78 BPM

## 2024-11-14 DIAGNOSIS — E78.5 DYSLIPIDEMIA: ICD-10-CM

## 2024-11-14 DIAGNOSIS — R00.2 PALPITATIONS: ICD-10-CM

## 2024-11-14 DIAGNOSIS — Q24.5 ANOMALOUS RIGHT CORONARY ARTERY: ICD-10-CM

## 2024-11-14 PROCEDURE — 99244 OFF/OP CNSLTJ NEW/EST MOD 40: CPT | Performed by: INTERNAL MEDICINE

## 2024-11-14 NOTE — ASSESSMENT & PLAN NOTE
Patient has dyslipidemia with good HDL.  Lifestyle modification recommended cut back on carbohydrate and fried food.

## 2024-12-05 ENCOUNTER — OFFICE VISIT (OUTPATIENT)
Dept: UROLOGY | Facility: CLINIC | Age: 49
End: 2024-12-05
Payer: COMMERCIAL

## 2024-12-05 VITALS
SYSTOLIC BLOOD PRESSURE: 118 MMHG | OXYGEN SATURATION: 97 % | BODY MASS INDEX: 24.92 KG/M2 | HEART RATE: 84 BPM | DIASTOLIC BLOOD PRESSURE: 80 MMHG | WEIGHT: 188 LBS | HEIGHT: 73 IN

## 2024-12-05 DIAGNOSIS — N50.89: Primary | ICD-10-CM

## 2024-12-05 PROCEDURE — 99212 OFFICE O/P EST SF 10 MIN: CPT | Performed by: UROLOGY

## 2024-12-05 NOTE — PROGRESS NOTES
Randy Bridges is a(n) 49 y.o. male. , :  1975    Subjective     Assessment:  There were no encounter diagnoses.  49 years old.  Had vasectomy 2022.  Overall seems satisfied.  Specimens confirmed bilateral vas.  Semen analysis was clean.  About 18 months following that he just had spontaneous discomfort for about a week or so on the left spermatic cord.  Seen in the office.  Ultrasound obtained.  Appears to be a granuloma.  Tending to calcify.  Since then pain resolved.  Has no complaints now.  Maybe had a little discomfort on the right side but nothing physically different on examination.  Refused physical examination by me today.  Did not think it was necessary.    Plan: He will call if any pain occurs again that is not improved with Motrin or if he feels a mass enlarging in the area.     Radiology  Scrotal ultrasound  Left cord granuloma   History  Vasectomy in 2022 with some pain 2024, self resolving with anti-inflammatory    Past  surgical history   Vasectomy in 2022 with some pain 2024, self resolving with anti-inflammatory    Prior Visits  4/15/2024  Left Scrotal Pain   -s/p vasectomy in 2022   -On examination today I appreciate I believe is a sperm granuloma in the left hemiscrotum.  There is also more fullness in the left inguinal region versus the right which is potentially representative of a hernia  -Further evaluation with ultrasound the scrotum and testicles  -Will call patient with results  -In the meantime for discomfort and for suspected sperm granuloma utilization of ibuprofen 800 mg 3 times daily for 3 days and 400 mg twice daily for 4 days after.  Advised patient to utilize with food    2.  Erectile dysfunction  -Potentially multifactorial with mild hypertension, hyperlipidemia, psychogenic factor.  After conversation the patient I believe that psychogenic factors are most at play given his blood pressure/hyperlipidemia is only mildly  "present and that sometimes he is able to achieve an erection without medication and other times even with medication and the result is less than ideal but that associate these episodes he states that he is worrying about the effect/quality of erection  -He does follow with a sex therapist at this point in time  -Continue to utilize Cialis as needed    3.  Pubic rash  -Area of increased erythema on the left aspect of the pubic area.  Completely asymptomatic  -Trial course of clotrimazole.  If no improvement referral to dermatology    12/5/2024 OV Jb Koehler  49 years old.  Had vasectomy October 2022.  Overall seems satisfied.  Specimens confirmed bilateral vas.  Semen analysis was clean.  About 18 months following that he just had spontaneous discomfort for about a week or so on the left spermatic cord.  Seen in the office.  Ultrasound obtained.  Appears to be a granuloma.  Tending to calcify.  Since then pain resolved.  Has no complaints now.  Maybe had a little discomfort on the right side but nothing physically different on examination.  Refused physical examination by me today.  Did not think it was necessary.    Plan: He will call if any pain occurs again that is not improved with Motrin or if he feels a mass enlarging in the area.    Review of Systems    No results found for: \"PSA\"  Lab Results   Component Value Date    TESTOSTERONE 725.0 06/27/2022     No components found for: \"CR\"  No results found for: \"HBA1C\"    Objective     /80 (BP Location: Left arm, Patient Position: Sitting, Cuff Size: Adult)   Pulse 84   Ht 6' 1\" (1.854 m)   Wt 85.3 kg (188 lb)   SpO2 97%   BMI 24.80 kg/m²     Physical Exam      [facility] [place]  "

## 2024-12-12 ENCOUNTER — HOSPITAL ENCOUNTER (OUTPATIENT)
Dept: NON INVASIVE DIAGNOSTICS | Facility: CLINIC | Age: 49
Discharge: HOME | End: 2024-12-12
Payer: COMMERCIAL

## 2024-12-12 VITALS
DIASTOLIC BLOOD PRESSURE: 80 MMHG | WEIGHT: 188.05 LBS | BODY MASS INDEX: 24.92 KG/M2 | HEIGHT: 73 IN | SYSTOLIC BLOOD PRESSURE: 118 MMHG | HEART RATE: 75 BPM

## 2024-12-12 DIAGNOSIS — E78.5 DYSLIPIDEMIA: ICD-10-CM

## 2024-12-12 DIAGNOSIS — Q24.5 ANOMALOUS RIGHT CORONARY ARTERY: ICD-10-CM

## 2024-12-12 DIAGNOSIS — R00.2 PALPITATIONS: ICD-10-CM

## 2024-12-12 LAB
AORTIC ROOT: 3.7 CM
APICAL FOUR CHAMBER EJECTION FRACTION: 59 %
ASCENDING AORTA: 3.2 CM
BSA FOR ECHO PROCEDURE: 2.1 M2
E WAVE DECELERATION TIME: 123 MS
E/A RATIO: 1.21
FRACTIONAL SHORTENING: 34 (ref 28–44)
INTERVENTRICULAR SEPTUM IN DIASTOLE (PARASTERNAL SHORT AXIS VIEW): 0.8 CM
INTERVENTRICULAR SEPTUM: 0.8 CM (ref 0.6–1.1)
LAAS-AP2: 19 CM2
LAAS-AP4: 16.3 CM2
LEFT ATRIUM AREA SYSTOLE SINGLE PLANE A4C: 15.8 CM2
LEFT ATRIUM SIZE: 2.7 CM
LEFT ATRIUM VOLUME (MOD BIPLANE): 50 ML
LEFT ATRIUM VOLUME INDEX (MOD BIPLANE): 23.8 ML/M2
LEFT INTERNAL DIMENSION IN SYSTOLE: 2.9 CM (ref 2.1–4)
LEFT VENTRICULAR INTERNAL DIMENSION IN DIASTOLE: 4.4 CM (ref 3.5–6)
LEFT VENTRICULAR POSTERIOR WALL IN END DIASTOLE: 0.8 CM
LEFT VENTRICULAR STROKE VOLUME: 54 ML
LVSV (TEICH): 54 ML
MV E'TISSUE VEL-SEP: 9 CM/S
MV PEAK A VEL: 0.73 M/S
MV PEAK E VEL: 88 CM/S
MV STENOSIS PRESSURE HALF TIME: 36 MS
MV VALVE AREA P 1/2 METHOD: 6.11
RIGHT ATRIUM AREA SYSTOLE A4C: 12.5 CM2
RIGHT VENTRICLE ID DIMENSION: 2.9 CM
SL CV LEFT ATRIUM LENGTH A2C: 5.3 CM
SL CV LV EF: 60
SL CV PED ECHO LEFT VENTRICLE DIASTOLIC VOLUME (MOD BIPLANE) 2D: 86 ML
SL CV PED ECHO LEFT VENTRICLE SYSTOLIC VOLUME (MOD BIPLANE) 2D: 32 ML
TR MAX PG: 14 MMHG
TR PEAK VELOCITY: 1.9 M/S
TRICUSPID ANNULAR PLANE SYSTOLIC EXCURSION: 2.3 CM
TRICUSPID VALVE PEAK REGURGITATION VELOCITY: 1.86 M/S

## 2024-12-12 PROCEDURE — 93306 TTE W/DOPPLER COMPLETE: CPT | Performed by: INTERNAL MEDICINE

## 2024-12-12 PROCEDURE — 93306 TTE W/DOPPLER COMPLETE: CPT

## 2024-12-13 ENCOUNTER — RESULTS FOLLOW-UP (OUTPATIENT)
Dept: CARDIOLOGY CLINIC | Facility: CLINIC | Age: 49
End: 2024-12-13

## 2024-12-13 NOTE — TELEPHONE ENCOUNTER
----- Message from Rafi Parks MD sent at 12/13/2024  9:35 AM EST -----  Patient's echo shows normal LV systolic function.  No significant, to mild valvular disease.  Patient can keep an appointment.    Please call patient about echo report.

## 2025-02-24 DIAGNOSIS — Z00.6 ENCOUNTER FOR EXAMINATION FOR NORMAL COMPARISON OR CONTROL IN CLINICAL RESEARCH PROGRAM: ICD-10-CM

## 2025-02-25 ENCOUNTER — APPOINTMENT (OUTPATIENT)
Dept: LAB | Facility: CLINIC | Age: 50
End: 2025-02-25

## 2025-02-25 DIAGNOSIS — Z00.6 ENCOUNTER FOR EXAMINATION FOR NORMAL COMPARISON OR CONTROL IN CLINICAL RESEARCH PROGRAM: ICD-10-CM

## 2025-02-25 PROCEDURE — 36415 COLL VENOUS BLD VENIPUNCTURE: CPT

## 2025-03-06 LAB
APOB+LDLR+PCSK9 GENE MUT ANL BLD/T: NOT DETECTED
BRCA1+BRCA2 DEL+DUP + FULL MUT ANL BLD/T: NOT DETECTED
MLH1+MSH2+MSH6+PMS2 GN DEL+DUP+FUL M: NOT DETECTED

## 2025-07-16 ENCOUNTER — RA CDI HCC (OUTPATIENT)
Dept: OTHER | Facility: HOSPITAL | Age: 50
End: 2025-07-16

## 2025-07-28 ENCOUNTER — OFFICE VISIT (OUTPATIENT)
Dept: FAMILY MEDICINE CLINIC | Facility: CLINIC | Age: 50
End: 2025-07-28
Payer: COMMERCIAL

## 2025-07-28 VITALS
DIASTOLIC BLOOD PRESSURE: 80 MMHG | BODY MASS INDEX: 24.65 KG/M2 | WEIGHT: 186 LBS | SYSTOLIC BLOOD PRESSURE: 120 MMHG | OXYGEN SATURATION: 100 % | HEIGHT: 73 IN | HEART RATE: 78 BPM

## 2025-07-28 DIAGNOSIS — Z13.1 SCREENING FOR DIABETES MELLITUS: ICD-10-CM

## 2025-07-28 DIAGNOSIS — Z12.5 SCREENING FOR MALIGNANT NEOPLASM OF PROSTATE: ICD-10-CM

## 2025-07-28 DIAGNOSIS — E78.5 DYSLIPIDEMIA: ICD-10-CM

## 2025-07-28 DIAGNOSIS — B00.1 RECURRENT COLD SORES: ICD-10-CM

## 2025-07-28 DIAGNOSIS — Q24.5 ANOMALOUS RIGHT CORONARY ARTERY: ICD-10-CM

## 2025-07-28 DIAGNOSIS — K21.9 LARYNGOPHARYNGEAL REFLUX: ICD-10-CM

## 2025-07-28 DIAGNOSIS — N52.03 COMBINED ARTERIAL INSUFFICIENCY AND CORPORO-VENOUS OCCLUSIVE ERECTILE DYSFUNCTION: Primary | ICD-10-CM

## 2025-07-28 DIAGNOSIS — Z13.0 SCREENING FOR DEFICIENCY ANEMIA: ICD-10-CM

## 2025-07-28 DIAGNOSIS — Z00.00 ENCOUNTER FOR PREVENTATIVE ADULT HEALTH CARE EXAMINATION: ICD-10-CM

## 2025-07-28 DIAGNOSIS — Z13.220 SCREENING CHOLESTEROL LEVEL: ICD-10-CM

## 2025-07-28 PROBLEM — Z84.81 FHX: BRCA2 GENE POSITIVE: Status: RESOLVED | Noted: 2019-11-20 | Resolved: 2025-07-28

## 2025-07-28 PROCEDURE — 99386 PREV VISIT NEW AGE 40-64: CPT | Performed by: FAMILY MEDICINE

## 2025-07-28 RX ORDER — VALACYCLOVIR HYDROCHLORIDE 1 G/1
2000 TABLET, FILM COATED ORAL 2 TIMES DAILY
Qty: 12 TABLET | Refills: 1 | Status: SHIPPED | OUTPATIENT
Start: 2025-07-28 | End: 2025-07-29

## 2025-07-28 RX ORDER — OMEPRAZOLE 20 MG/1
20 CAPSULE, DELAYED RELEASE ORAL DAILY
Qty: 90 CAPSULE | Refills: 1 | Status: SHIPPED | OUTPATIENT
Start: 2025-07-28

## 2025-07-28 RX ORDER — SILDENAFIL 25 MG/1
25 TABLET, FILM COATED ORAL DAILY PRN
Qty: 30 TABLET | Refills: 0 | Status: SHIPPED | OUTPATIENT
Start: 2025-07-28